# Patient Record
Sex: MALE | Race: WHITE | HISPANIC OR LATINO | URBAN - METROPOLITAN AREA
[De-identification: names, ages, dates, MRNs, and addresses within clinical notes are randomized per-mention and may not be internally consistent; named-entity substitution may affect disease eponyms.]

---

## 2017-08-03 ENCOUNTER — EMERGENCY (EMERGENCY)
Facility: HOSPITAL | Age: 42
LOS: 1 days | End: 2017-08-03
Payer: COMMERCIAL

## 2017-08-03 PROCEDURE — 99284 EMERGENCY DEPT VISIT MOD MDM: CPT

## 2017-08-03 PROCEDURE — 72040 X-RAY EXAM NECK SPINE 2-3 VW: CPT | Mod: 26

## 2017-08-03 PROCEDURE — 72110 X-RAY EXAM L-2 SPINE 4/>VWS: CPT | Mod: 26

## 2017-09-04 ENCOUNTER — EMERGENCY (EMERGENCY)
Facility: HOSPITAL | Age: 42
LOS: 1 days | Discharge: HOME | End: 2017-09-04
Attending: EMERGENCY MEDICINE | Admitting: EMERGENCY MEDICINE
Payer: OTHER MISCELLANEOUS

## 2017-09-04 VITALS
RESPIRATION RATE: 18 BRPM | DIASTOLIC BLOOD PRESSURE: 70 MMHG | OXYGEN SATURATION: 98 % | SYSTOLIC BLOOD PRESSURE: 125 MMHG | HEART RATE: 90 BPM

## 2017-09-04 VITALS
RESPIRATION RATE: 17 BRPM | DIASTOLIC BLOOD PRESSURE: 88 MMHG | OXYGEN SATURATION: 97 % | HEART RATE: 95 BPM | SYSTOLIC BLOOD PRESSURE: 138 MMHG | TEMPERATURE: 98 F

## 2017-09-04 LAB
BASE EXCESS BLDV CALC-SCNC: -0.1 MMOL/L — SIGNIFICANT CHANGE UP
BLOOD GAS VENOUS - CREATININE: 1.56 MG/DL — HIGH (ref 0.5–1.3)
CHLORIDE BLDV-SCNC: 109 MMOL/L — HIGH (ref 96–108)
COHGB MFR BLDV: 1.3 % — SIGNIFICANT CHANGE UP (ref 0.5–1.5)
COHGB MFR BLDV: 15.6 G/DL — SIGNIFICANT CHANGE UP (ref 13–17)
GAS PNL BLDV: 137 MMOL/L — SIGNIFICANT CHANGE UP (ref 136–146)
GLUCOSE BLDV-MCNC: 167 — HIGH (ref 70–99)
HCO3 BLDV-SCNC: 24 MMOL/L — SIGNIFICANT CHANGE UP (ref 20–27)
HCT VFR BLDV CALC: 47.8 % — SIGNIFICANT CHANGE UP (ref 39–51)
HGB BLDV-MCNC: 15.6 G/DL — SIGNIFICANT CHANGE UP (ref 13–17)
LACTATE BLDV-MCNC: 2.6 MMOL/L — HIGH (ref 0.5–2)
METHGB MFR BLDV: 1.4 % — SIGNIFICANT CHANGE UP (ref 0–1.5)
OXYHGB MFR BLDV: 82.7 % — LOW (ref 94–98)
PCO2 BLDV: 45 MMHG — SIGNIFICANT CHANGE UP (ref 41–51)
PH BLDV: 7.36 PH — SIGNIFICANT CHANGE UP (ref 7.32–7.43)
PO2 BLDV: 52 MMHG — HIGH (ref 35–40)
POTASSIUM BLDV-SCNC: 3.5 MMOL/L — SIGNIFICANT CHANGE UP (ref 3.4–4.5)
SAO2 % BLDV: 85 % — SIGNIFICANT CHANGE UP (ref 60–85)

## 2017-09-04 PROCEDURE — 99283 EMERGENCY DEPT VISIT LOW MDM: CPT | Mod: 25

## 2017-09-04 PROCEDURE — 99053 MED SERV 10PM-8AM 24 HR FAC: CPT

## 2017-09-04 RX ORDER — IBUPROFEN 200 MG
600 TABLET ORAL ONCE
Qty: 0 | Refills: 0 | Status: COMPLETED | OUTPATIENT
Start: 2017-09-04 | End: 2017-09-04

## 2017-09-04 RX ADMIN — Medication 600 MILLIGRAM(S): at 04:42
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
## 2017-09-04 NOTE — ED ADULT NURSE NOTE - OBJECTIVE STATEMENT
Received pt in room 7, pt A&Ox4, respirations even and unlabored b/l. Redness noted on neck and shoulders b/l. Skin tears noted on knees b/l. IVL 18g Angiocath noted on left AC by EMS, receiving 1L of NS bolus, pt reports already received x2 1L of NS bags. MD javed at bedside. Awaiting further orders. Will continue to monitor.
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
## 2017-09-04 NOTE — ED PROVIDER NOTE - PROGRESS NOTE DETAILS
Patient feels much improved. Dispo plan reviewed. Antibiotic ointment provided. Patient had received IVF NS 2 liters by EMS
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
## 2017-10-10 ENCOUNTER — TRANSCRIPTION ENCOUNTER (OUTPATIENT)
Age: 42
End: 2017-10-10

## 2018-01-16 ENCOUNTER — TRANSCRIPTION ENCOUNTER (OUTPATIENT)
Age: 43
End: 2018-01-16

## 2018-04-14 ENCOUNTER — EMERGENCY (EMERGENCY)
Facility: HOSPITAL | Age: 43
LOS: 0 days | Discharge: HOME | End: 2018-04-14
Attending: EMERGENCY MEDICINE
Payer: OTHER MISCELLANEOUS

## 2018-04-14 VITALS
RESPIRATION RATE: 16 BRPM | SYSTOLIC BLOOD PRESSURE: 112 MMHG | TEMPERATURE: 98 F | OXYGEN SATURATION: 98 % | DIASTOLIC BLOOD PRESSURE: 61 MMHG | HEART RATE: 59 BPM

## 2018-04-14 VITALS
DIASTOLIC BLOOD PRESSURE: 74 MMHG | TEMPERATURE: 98 F | RESPIRATION RATE: 18 BRPM | HEIGHT: 69 IN | WEIGHT: 184.97 LBS | HEART RATE: 65 BPM | SYSTOLIC BLOOD PRESSURE: 121 MMHG | OXYGEN SATURATION: 98 %

## 2018-04-14 DIAGNOSIS — Z79.1 LONG TERM (CURRENT) USE OF NON-STEROIDAL ANTI-INFLAMMATORIES (NSAID): ICD-10-CM

## 2018-04-14 DIAGNOSIS — R55 SYNCOPE AND COLLAPSE: ICD-10-CM

## 2018-04-14 DIAGNOSIS — Z88.0 ALLERGY STATUS TO PENICILLIN: ICD-10-CM

## 2018-04-14 LAB
ALBUMIN SERPL ELPH-MCNC: 4.1 G/DL — SIGNIFICANT CHANGE UP (ref 3.3–5)
ALP SERPL-CCNC: 77 U/L — SIGNIFICANT CHANGE UP (ref 40–120)
ALT FLD-CCNC: 56 U/L — SIGNIFICANT CHANGE UP (ref 12–78)
ANION GAP SERPL CALC-SCNC: 8 MMOL/L — SIGNIFICANT CHANGE UP (ref 5–17)
APTT BLD: 35.5 SEC — SIGNIFICANT CHANGE UP (ref 27.5–37.4)
AST SERPL-CCNC: 25 U/L — SIGNIFICANT CHANGE UP (ref 15–37)
BILIRUB SERPL-MCNC: 2.2 MG/DL — HIGH (ref 0.2–1.2)
BUN SERPL-MCNC: 24 MG/DL — HIGH (ref 7–23)
CALCIUM SERPL-MCNC: 8.8 MG/DL — SIGNIFICANT CHANGE UP (ref 8.5–10.1)
CHLORIDE SERPL-SCNC: 105 MMOL/L — SIGNIFICANT CHANGE UP (ref 96–108)
CO2 SERPL-SCNC: 30 MMOL/L — SIGNIFICANT CHANGE UP (ref 22–31)
CREAT SERPL-MCNC: 1.27 MG/DL — SIGNIFICANT CHANGE UP (ref 0.5–1.3)
GLUCOSE SERPL-MCNC: 79 MG/DL — SIGNIFICANT CHANGE UP (ref 70–99)
HCT VFR BLD CALC: 43.4 % — SIGNIFICANT CHANGE UP (ref 39–50)
HGB BLD-MCNC: 15.2 G/DL — SIGNIFICANT CHANGE UP (ref 13–17)
INR BLD: 1.05 RATIO — SIGNIFICANT CHANGE UP (ref 0.88–1.16)
MAGNESIUM SERPL-MCNC: 2.4 MG/DL — SIGNIFICANT CHANGE UP (ref 1.6–2.6)
MCHC RBC-ENTMCNC: 31.3 PG — SIGNIFICANT CHANGE UP (ref 27–34)
MCHC RBC-ENTMCNC: 35 GM/DL — SIGNIFICANT CHANGE UP (ref 32–36)
MCV RBC AUTO: 89.5 FL — SIGNIFICANT CHANGE UP (ref 80–100)
NRBC # BLD: 0 /100 WBCS — SIGNIFICANT CHANGE UP (ref 0–0)
PLATELET # BLD AUTO: 248 K/UL — SIGNIFICANT CHANGE UP (ref 150–400)
POTASSIUM SERPL-MCNC: 3.9 MMOL/L — SIGNIFICANT CHANGE UP (ref 3.5–5.3)
POTASSIUM SERPL-SCNC: 3.9 MMOL/L — SIGNIFICANT CHANGE UP (ref 3.5–5.3)
PROT SERPL-MCNC: 7.6 GM/DL — SIGNIFICANT CHANGE UP (ref 6–8.3)
PROTHROM AB SERPL-ACNC: 11.5 SEC — SIGNIFICANT CHANGE UP (ref 9.8–12.7)
RBC # BLD: 4.85 M/UL — SIGNIFICANT CHANGE UP (ref 4.2–5.8)
RBC # FLD: 12.1 % — SIGNIFICANT CHANGE UP (ref 10.3–14.5)
SODIUM SERPL-SCNC: 143 MMOL/L — SIGNIFICANT CHANGE UP (ref 135–145)
TROPONIN I SERPL-MCNC: <.015 NG/ML — SIGNIFICANT CHANGE UP (ref 0.01–0.04)
WBC # BLD: 7.43 K/UL — SIGNIFICANT CHANGE UP (ref 3.8–10.5)
WBC # FLD AUTO: 7.43 K/UL — SIGNIFICANT CHANGE UP (ref 3.8–10.5)

## 2018-04-14 PROCEDURE — 71045 X-RAY EXAM CHEST 1 VIEW: CPT | Mod: 26

## 2018-04-14 PROCEDURE — 70450 CT HEAD/BRAIN W/O DYE: CPT | Mod: 26

## 2018-04-14 PROCEDURE — 99284 EMERGENCY DEPT VISIT MOD MDM: CPT

## 2018-04-14 RX ORDER — SODIUM CHLORIDE 9 MG/ML
1000 INJECTION INTRAMUSCULAR; INTRAVENOUS; SUBCUTANEOUS ONCE
Qty: 0 | Refills: 0 | Status: COMPLETED | OUTPATIENT
Start: 2018-04-14 | End: 2018-04-14

## 2018-04-14 RX ADMIN — SODIUM CHLORIDE 1000 MILLILITER(S): 9 INJECTION INTRAMUSCULAR; INTRAVENOUS; SUBCUTANEOUS at 16:41

## 2018-04-14 RX ADMIN — SODIUM CHLORIDE 1000 MILLILITER(S): 9 INJECTION INTRAMUSCULAR; INTRAVENOUS; SUBCUTANEOUS at 15:40

## 2018-04-14 NOTE — ED PROVIDER NOTE - PROGRESS NOTE DETAILS
Results reported to patient--grossly benign, likely vasovagal pre-syncope  Pt. reports feeling better after meds  pt. agrees to f/u with primary care outpt. as well as cardio  pt. understands to return to ED if symptoms worsen; will d/c
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
## 2018-04-14 NOTE — ED PROVIDER NOTE - PHYSICAL EXAMINATION
Vitals: WNL  Gen: AAOx3, NAD, sitting comfortably in stretcher, calm, cooperative, pleasant, non-toxic  Head: ncat, perrla, eomi b/l  Neck: supple, no lymphadenopathy, no midline deviation  Heart: rrr, no m/r/g  Lungs: CTA b/l, no rales/ronchi/wheezes  Abd: soft, nontender, non-distended, no rebound or guarding  Ext: no clubbing/cyanosis/edema  Neuro: sensation and muscle strength intact b/l, steady gait, CN2-12 intact b/l

## 2018-04-15 PROCEDURE — 93010 ELECTROCARDIOGRAM REPORT: CPT

## 2018-09-23 ENCOUNTER — EMERGENCY (EMERGENCY)
Facility: HOSPITAL | Age: 43
LOS: 1 days | Discharge: HOME | End: 2018-09-23
Attending: EMERGENCY MEDICINE
Payer: SELF-PAY

## 2018-09-23 VITALS
HEART RATE: 61 BPM | SYSTOLIC BLOOD PRESSURE: 130 MMHG | DIASTOLIC BLOOD PRESSURE: 83 MMHG | OXYGEN SATURATION: 100 % | RESPIRATION RATE: 18 BRPM | TEMPERATURE: 98 F

## 2018-09-23 VITALS — WEIGHT: 179.9 LBS | HEIGHT: 69 IN

## 2018-09-23 DIAGNOSIS — Z98.890 OTHER SPECIFIED POSTPROCEDURAL STATES: Chronic | ICD-10-CM

## 2018-09-23 DIAGNOSIS — Z87.81 PERSONAL HISTORY OF (HEALED) TRAUMATIC FRACTURE: Chronic | ICD-10-CM

## 2018-09-23 LAB
APPEARANCE UR: CLEAR — SIGNIFICANT CHANGE UP
BILIRUB UR-MCNC: NEGATIVE — SIGNIFICANT CHANGE UP
COLOR SPEC: YELLOW — SIGNIFICANT CHANGE UP
DIFF PNL FLD: ABNORMAL
EPI CELLS # UR: SIGNIFICANT CHANGE UP
GLUCOSE UR QL: NEGATIVE MG/DL — SIGNIFICANT CHANGE UP
KETONES UR-MCNC: NEGATIVE — SIGNIFICANT CHANGE UP
LEUKOCYTE ESTERASE UR-ACNC: NEGATIVE — SIGNIFICANT CHANGE UP
NITRITE UR-MCNC: NEGATIVE — SIGNIFICANT CHANGE UP
PH UR: 5 — SIGNIFICANT CHANGE UP (ref 5–8)
PROT UR-MCNC: NEGATIVE MG/DL — SIGNIFICANT CHANGE UP
RBC CASTS # UR COMP ASSIST: ABNORMAL /HPF (ref 0–4)
SP GR SPEC: 1.02 — SIGNIFICANT CHANGE UP (ref 1.01–1.02)
UROBILINOGEN FLD QL: NEGATIVE MG/DL — SIGNIFICANT CHANGE UP
WBC UR QL: SIGNIFICANT CHANGE UP

## 2018-09-23 PROCEDURE — 99284 EMERGENCY DEPT VISIT MOD MDM: CPT | Mod: 25

## 2018-09-23 PROCEDURE — 99284 EMERGENCY DEPT VISIT MOD MDM: CPT

## 2018-09-23 PROCEDURE — 81001 URINALYSIS AUTO W/SCOPE: CPT

## 2018-09-23 PROCEDURE — 74176 CT ABD & PELVIS W/O CONTRAST: CPT

## 2018-09-23 PROCEDURE — 74176 CT ABD & PELVIS W/O CONTRAST: CPT | Mod: 26

## 2018-09-23 RX ORDER — IBUPROFEN 200 MG
600 TABLET ORAL ONCE
Qty: 0 | Refills: 0 | Status: COMPLETED | OUTPATIENT
Start: 2018-09-23 | End: 2018-09-23

## 2018-09-23 RX ADMIN — Medication 600 MILLIGRAM(S): at 11:33

## 2018-09-23 NOTE — ED STATDOCS - PHYSICAL EXAMINATION
VITAL SIGNS: I have reviewed nursing notes and confirm.  CONSTITUTIONAL: Well-developed; well-nourished; in no acute distress.  SKIN: Skin exam is warm and dry, no acute rash.  HEAD: Normocephalic; atraumatic.  EYES: PERRL, EOM intact; conjunctiva and sclera clear.  ENT: No nasal discharge; airway clear. Throat clear.  NECK: Supple; non tender.  No lymphadenopathy.  CARD: S1, S2 normal; no murmurs, gallops, or rubs. Regular rate and rhythm.  RESP: No wheezes, rales or rhonchi.  ABD: Normal bowel sounds; soft; non-distended; no hepatosplenomegaly.  LLQ tenderness, no CVA tenderness, no palpable hernia   EXT: Normal ROM. No clubbing, cyanosis or edema.  NEURO: Alert, oriented. Grossly unremarkable. No focal deficits. no facial droop. moves all extremities.  PSYCH: Cooperative, appropriate.

## 2018-09-23 NOTE — ED STATDOCS - ATTENDING CONTRIBUTION TO CARE
I, Darian Burkett, performed the initial face to face bedside interview with this patient regarding history of present illness, review of symptoms and relevant past medical, social and family history.  I completed an independent physical examination.  I was the initial provider who evaluated this patient. I have signed out the follow up of any pending tests (i.e. labs, radiological studies) to the ACP.  I have communicated the patient’s plan of care and disposition with the ACP.

## 2018-09-23 NOTE — ED STATDOCS - OBJECTIVE STATEMENT
41 y/o M pt presents to ED c/o gradual worsening left lower abdominal pain for the past 2 days. Pain described as a sharp, stabbing sensation. Worsening with movement, getting out of the vehicle. He admits to doing strenuous training for work a few days ago when he pulled his groin area; however symptoms started days later. No hx of kidney stones. Denies nausea, vomiting, diarrhea, fevers, chills, cough. No further complaints at this time.

## 2018-09-23 NOTE — ED STATDOCS - PROGRESS NOTE DETAILS
Pt moved form intake Room. Pt seen and evaluated by intake Physician. HPI, Physical examination performed by intake Physician . Note reviewed and followup examination performed by me consistent with initial assessment. Agrees with intake Physician plan and tests. Pt CT + pelvic mesentery adenitis. Pt informed about his results and will continue with Naproxen for his pain. Pt D/C in stable condition with F/U with PCP.

## 2018-09-23 NOTE — ED ADULT NURSE NOTE - OBJECTIVE STATEMENT
Patient arrived to ED today with c/o left lower abdominal pain for the past 2 days.  Patient denies chest pain, SOB, numbness or tingling, fever, n/d/v.

## 2018-09-23 NOTE — ED ADULT TRIAGE NOTE - CHIEF COMPLAINT QUOTE
"I am having a real sharp pain on  my left side that started two days and its worsening. " Pt denies n/v/d states he is having a regular bowel movements just has a sharp pain.,  pt denies radiation of pain.

## 2018-09-23 NOTE — ED STATDOCS - NS ED ROS FT
Review of Systems:  	•	CONSTITUTIONAL - no fever, no diaphoresis, no weight change  	•	SKIN - no rash  	•	HEMATOLOGIC - no bleeding, no bruising  	•	EYES - no eye pain, no blurred vision  	•	ENT - no change in hearing, no pain  	•	RESPIRATORY - no shortness of breath, no cough  	•	CARDIAC - no chest pain, no palpitations  	•	GI - + abd pain, no nausea, no vomiting, no diarrhea, no constipation, no bleeding  	•	GENITO-URINARY - no discharge, no dysuria; no hematuria,   	•	ENDO - no polydypsia, no polyurea, no heat/no cold intolerance  	•	MUSCULOSKELETAL - no joint paint, no swelling, no redness  	•	NEUROLOGIC - no weakness, no headache, no anesthesia, no paresthesias  	•	PSYCH - no anxiety, non suicidal, non homicidal, no hallucination, no depression

## 2018-09-23 NOTE — ED STATDOCS - MEDICAL DECISION MAKING DETAILS
pt with intermittent sharp LLQ pain for 2 days non radiation, able to tolerate PO UA, CT abdomen to rule out kidney stones , Motrin for pain.

## 2018-09-23 NOTE — ED STATDOCS - CARE PLAN
Principal Discharge DX:	Groin strain, left, initial encounter  Assessment and plan of treatment:	Continue with Naproxen for pain and F/U with PCP as discussed  Secondary Diagnosis:	Epiploic appendagitis

## 2019-10-15 ENCOUNTER — TRANSCRIPTION ENCOUNTER (OUTPATIENT)
Age: 44
End: 2019-10-15

## 2020-08-28 ENCOUNTER — APPOINTMENT (OUTPATIENT)
Dept: OTOLARYNGOLOGY | Facility: CLINIC | Age: 45
End: 2020-08-28

## 2021-12-31 ENCOUNTER — TRANSCRIPTION ENCOUNTER (OUTPATIENT)
Age: 46
End: 2021-12-31

## 2024-05-19 ENCOUNTER — INPATIENT (INPATIENT)
Facility: HOSPITAL | Age: 49
LOS: 7 days | Discharge: HOME | DRG: 684 | End: 2024-05-27
Attending: STUDENT IN AN ORGANIZED HEALTH CARE EDUCATION/TRAINING PROGRAM
Payer: COMMERCIAL

## 2024-05-19 VITALS
HEIGHT: 69 IN | RESPIRATION RATE: 20 BRPM | WEIGHT: 176.37 LBS | HEART RATE: 76 BPM | OXYGEN SATURATION: 97 % | TEMPERATURE: 98 F | SYSTOLIC BLOOD PRESSURE: 106 MMHG | DIASTOLIC BLOOD PRESSURE: 75 MMHG

## 2024-05-19 DIAGNOSIS — Z98.890 OTHER SPECIFIED POSTPROCEDURAL STATES: Chronic | ICD-10-CM

## 2024-05-19 DIAGNOSIS — Z87.81 PERSONAL HISTORY OF (HEALED) TRAUMATIC FRACTURE: Chronic | ICD-10-CM

## 2024-05-19 DIAGNOSIS — N17.9 ACUTE KIDNEY FAILURE, UNSPECIFIED: ICD-10-CM

## 2024-05-19 LAB
ALBUMIN SERPL ELPH-MCNC: 4.4 G/DL — SIGNIFICANT CHANGE UP (ref 3.3–5.2)
ALP SERPL-CCNC: 84 U/L — SIGNIFICANT CHANGE UP (ref 40–120)
ALT FLD-CCNC: 71 U/L — HIGH
ANION GAP SERPL CALC-SCNC: 23 MMOL/L — HIGH (ref 5–17)
AST SERPL-CCNC: 55 U/L — HIGH
BASE EXCESS BLDV CALC-SCNC: -3.9 MMOL/L — LOW (ref -2–3)
BASOPHILS # BLD AUTO: 0.05 K/UL — SIGNIFICANT CHANGE UP (ref 0–0.2)
BASOPHILS NFR BLD AUTO: 0.6 % — SIGNIFICANT CHANGE UP (ref 0–2)
BILIRUB SERPL-MCNC: 0.8 MG/DL — SIGNIFICANT CHANGE UP (ref 0.4–2)
BUN SERPL-MCNC: 55.7 MG/DL — HIGH (ref 8–20)
CA-I SERPL-SCNC: 1.02 MMOL/L — LOW (ref 1.15–1.33)
CALCIUM SERPL-MCNC: 9.1 MG/DL — SIGNIFICANT CHANGE UP (ref 8.4–10.5)
CHLORIDE BLDV-SCNC: 100 MMOL/L — SIGNIFICANT CHANGE UP (ref 96–108)
CHLORIDE SERPL-SCNC: 89 MMOL/L — LOW (ref 96–108)
CO2 SERPL-SCNC: 20 MMOL/L — LOW (ref 22–29)
CREAT SERPL-MCNC: 3.97 MG/DL — HIGH (ref 0.5–1.3)
EGFR: 18 ML/MIN/1.73M2 — LOW
EOSINOPHIL # BLD AUTO: 0.01 K/UL — SIGNIFICANT CHANGE UP (ref 0–0.5)
EOSINOPHIL NFR BLD AUTO: 0.1 % — SIGNIFICANT CHANGE UP (ref 0–6)
FLUAV AG NPH QL: DETECTED
FLUBV AG NPH QL: SIGNIFICANT CHANGE UP
GAS PNL BLDV: 130 MMOL/L — LOW (ref 136–145)
GAS PNL BLDV: SIGNIFICANT CHANGE UP
GLUCOSE BLDV-MCNC: 122 MG/DL — HIGH (ref 70–99)
GLUCOSE SERPL-MCNC: 126 MG/DL — HIGH (ref 70–99)
HCO3 BLDV-SCNC: 23 MMOL/L — SIGNIFICANT CHANGE UP (ref 22–29)
HCT VFR BLD CALC: 53.8 % — HIGH (ref 39–50)
HCT VFR BLDA CALC: 50 % — SIGNIFICANT CHANGE UP
HGB BLD CALC-MCNC: 16.5 G/DL — SIGNIFICANT CHANGE UP (ref 12.6–17.4)
HGB BLD-MCNC: 18.4 G/DL — HIGH (ref 13–17)
IMM GRANULOCYTES NFR BLD AUTO: 0.7 % — SIGNIFICANT CHANGE UP (ref 0–0.9)
LACTATE BLDV-MCNC: 1.2 MMOL/L — SIGNIFICANT CHANGE UP (ref 0.5–2)
LYMPHOCYTES # BLD AUTO: 1.07 K/UL — SIGNIFICANT CHANGE UP (ref 1–3.3)
LYMPHOCYTES # BLD AUTO: 12.1 % — LOW (ref 13–44)
MCHC RBC-ENTMCNC: 28.2 PG — SIGNIFICANT CHANGE UP (ref 27–34)
MCHC RBC-ENTMCNC: 34.2 GM/DL — SIGNIFICANT CHANGE UP (ref 32–36)
MCV RBC AUTO: 82.4 FL — SIGNIFICANT CHANGE UP (ref 80–100)
MONOCYTES # BLD AUTO: 1.23 K/UL — HIGH (ref 0–0.9)
MONOCYTES NFR BLD AUTO: 14 % — SIGNIFICANT CHANGE UP (ref 2–14)
NEUTROPHILS # BLD AUTO: 6.39 K/UL — SIGNIFICANT CHANGE UP (ref 1.8–7.4)
NEUTROPHILS NFR BLD AUTO: 72.5 % — SIGNIFICANT CHANGE UP (ref 43–77)
PCO2 BLDV: 50 MMHG — SIGNIFICANT CHANGE UP (ref 42–55)
PH BLDV: 7.27 — LOW (ref 7.32–7.43)
PLATELET # BLD AUTO: 227 K/UL — SIGNIFICANT CHANGE UP (ref 150–400)
PO2 BLDV: 43 MMHG — SIGNIFICANT CHANGE UP (ref 25–45)
POTASSIUM BLDV-SCNC: 4.1 MMOL/L — SIGNIFICANT CHANGE UP (ref 3.5–5.1)
POTASSIUM SERPL-MCNC: 4.4 MMOL/L — SIGNIFICANT CHANGE UP (ref 3.5–5.3)
POTASSIUM SERPL-SCNC: 4.4 MMOL/L — SIGNIFICANT CHANGE UP (ref 3.5–5.3)
PROT SERPL-MCNC: 8.7 G/DL — SIGNIFICANT CHANGE UP (ref 6.6–8.7)
RBC # BLD: 6.53 M/UL — HIGH (ref 4.2–5.8)
RBC # FLD: 13.7 % — SIGNIFICANT CHANGE UP (ref 10.3–14.5)
RSV RNA NPH QL NAA+NON-PROBE: SIGNIFICANT CHANGE UP
SAO2 % BLDV: 72 % — SIGNIFICANT CHANGE UP
SARS-COV-2 RNA SPEC QL NAA+PROBE: SIGNIFICANT CHANGE UP
SODIUM SERPL-SCNC: 132 MMOL/L — LOW (ref 135–145)
WBC # BLD: 8.81 K/UL — SIGNIFICANT CHANGE UP (ref 3.8–10.5)
WBC # FLD AUTO: 8.81 K/UL — SIGNIFICANT CHANGE UP (ref 3.8–10.5)

## 2024-05-19 PROCEDURE — 99285 EMERGENCY DEPT VISIT HI MDM: CPT

## 2024-05-19 PROCEDURE — 93010 ELECTROCARDIOGRAM REPORT: CPT

## 2024-05-19 PROCEDURE — 99223 1ST HOSP IP/OBS HIGH 75: CPT

## 2024-05-19 RX ORDER — SODIUM CHLORIDE 9 MG/ML
1000 INJECTION INTRAMUSCULAR; INTRAVENOUS; SUBCUTANEOUS
Refills: 0 | Status: DISCONTINUED | OUTPATIENT
Start: 2024-05-19 | End: 2024-05-19

## 2024-05-19 RX ORDER — ONDANSETRON 8 MG/1
4 TABLET, FILM COATED ORAL ONCE
Refills: 0 | Status: COMPLETED | OUTPATIENT
Start: 2024-05-19 | End: 2024-05-19

## 2024-05-19 RX ORDER — SODIUM CHLORIDE 9 MG/ML
1000 INJECTION INTRAMUSCULAR; INTRAVENOUS; SUBCUTANEOUS ONCE
Refills: 0 | Status: COMPLETED | OUTPATIENT
Start: 2024-05-19 | End: 2024-05-19

## 2024-05-19 RX ORDER — ONDANSETRON 8 MG/1
4 TABLET, FILM COATED ORAL EVERY 8 HOURS
Refills: 0 | Status: DISCONTINUED | OUTPATIENT
Start: 2024-05-19 | End: 2024-05-27

## 2024-05-19 RX ORDER — SODIUM CHLORIDE 9 MG/ML
2000 INJECTION INTRAMUSCULAR; INTRAVENOUS; SUBCUTANEOUS ONCE
Refills: 0 | Status: COMPLETED | OUTPATIENT
Start: 2024-05-19 | End: 2024-05-19

## 2024-05-19 RX ORDER — IBUPROFEN 200 MG
0 TABLET ORAL
Qty: 0 | Refills: 0 | DISCHARGE

## 2024-05-19 RX ORDER — KETOROLAC TROMETHAMINE 30 MG/ML
15 SYRINGE (ML) INJECTION ONCE
Refills: 0 | Status: DISCONTINUED | OUTPATIENT
Start: 2024-05-19 | End: 2024-05-19

## 2024-05-19 RX ORDER — FLUTICASONE PROPIONATE 50 MCG
1 SPRAY, SUSPENSION NASAL
Refills: 0 | DISCHARGE

## 2024-05-19 RX ORDER — LANOLIN ALCOHOL/MO/W.PET/CERES
3 CREAM (GRAM) TOPICAL AT BEDTIME
Refills: 0 | Status: DISCONTINUED | OUTPATIENT
Start: 2024-05-19 | End: 2024-05-27

## 2024-05-19 RX ORDER — ACETAMINOPHEN 500 MG
650 TABLET ORAL EVERY 6 HOURS
Refills: 0 | Status: DISCONTINUED | OUTPATIENT
Start: 2024-05-19 | End: 2024-05-27

## 2024-05-19 RX ORDER — SODIUM BICARBONATE 1 MEQ/ML
0.19 SYRINGE (ML) INTRAVENOUS
Qty: 150 | Refills: 0 | Status: DISCONTINUED | OUTPATIENT
Start: 2024-05-19 | End: 2024-05-20

## 2024-05-19 RX ADMIN — Medication 15 MILLIGRAM(S): at 15:23

## 2024-05-19 RX ADMIN — SODIUM CHLORIDE 2000 MILLILITER(S): 9 INJECTION INTRAMUSCULAR; INTRAVENOUS; SUBCUTANEOUS at 15:23

## 2024-05-19 RX ADMIN — Medication 100 MEQ/KG/HR: at 17:41

## 2024-05-19 RX ADMIN — Medication 650 MILLIGRAM(S): at 21:31

## 2024-05-19 RX ADMIN — ONDANSETRON 4 MILLIGRAM(S): 8 TABLET, FILM COATED ORAL at 15:23

## 2024-05-19 NOTE — ED ADULT NURSE NOTE - OBJECTIVE STATEMENT
Pt received A&Ox4 c/o nvd, generalized body weakness, body aches for few days. Decreased PO intake. States lost 10 pounds in 1 week. Recent travels to [place] Denies any fevers, chill, sob, chest pain. Respirations even & unlabored. NAD. Pt made aware of plan of care and verbalized understanding.

## 2024-05-19 NOTE — ED PROVIDER NOTE - CLINICAL SUMMARY MEDICAL DECISION MAKING FREE TEXT BOX
47yo male with no PMH presenting with n/v/d a/w fever, found to be flu A postiive. Check labs, give IV fluids, toradol,, zofran and reassess. Ashly Sandy DO 47yo male with no PMH presenting with n/v/d a/w fever, found to be flu A postiive. Check labs, give IV fluids, toradol,, zofran and reassess. Ashly Sandy DO    Labs reviewed patient noted to have JOSIE- Cr 3.97, anion gap 23. VBG ordered. Patient denies urinary retention, able to void. D/w hospitalist will admit for further management, additional liter of NS ordered, started on maintenance fluids.

## 2024-05-19 NOTE — H&P ADULT - NSICDXPASTMEDICALHX_GEN_ALL_CORE_FT
PAST MEDICAL HISTORY:  No pertinent past medical history     No pertinent past medical history     WILFREDO on CPAP

## 2024-05-19 NOTE — ED ADULT NURSE NOTE - NS_SISCREENINGSR_GEN_ALL_ED
CONSTITUTIONAL: Well-developed; well-nourished; in no acute distress.   SKIN: warm, dry, erythema to left upper chest, 4 cm area of erythema to left flexor surface of forearm, small 1cm abrasion to left knee  HEAD: Normocephalic; atraumatic.  EYES: PERRL, EOMI, no conjunctival erythema  ENT: No nasal discharge; airway clear.  NECK: Supple; non tender.  CARD: S1, S2 normal; no murmurs, gallops, or rubs. Regular rate and rhythm.   RESP: No wheezes, rales or rhonchi.  ABD: soft ntnd  EXT: Normal ROM.  No clubbing, cyanosis or edema.   LYMPH: No acute cervical adenopathy.  NEURO: Alert, oriented, grossly unremarkable, benign neuro exam  PSYCH: Cooperative, appropriate. CONSTITUTIONAL: Well-developed; well-nourished; in no acute distress.   SKIN: warm, dry, erythema to left upper chest, 4 cm area of erythema to left flexor surface of forearm, small 1cm abrasion to left knee  HEAD: Normocephalic; atraumatic.  EYES: PERRL, EOMI, no conjunctival erythema  ENT: No nasal discharge; airway clear.  NECK: Supple; non tender.  CARD: S1, S2 normal; no murmurs, gallops, or rubs. Regular rate and rhythm.   RESP: No wheezes, rales or rhonchi.  ABD: soft ntnd  EXT: TTP L pinky, pulses and sensation intact. Full ROM  LYMPH: No acute cervical adenopathy.  NEURO: Alert, oriented, grossly unremarkable, benign neuro exam  PSYCH: Cooperative, appropriate. Negative
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
## 2024-05-19 NOTE — ED PROVIDER NOTE - OBJECTIVE STATEMENT
49yo male with no PMH presenting with n/v/d a/w fever x 2 days. patient recently traveled to [facility], went to  Friday and was told he has influenza. patient states he has been unable to tolerate PO over last week and has lost 10 lbs.

## 2024-05-19 NOTE — H&P ADULT - HISTORY OF PRESENT ILLNESS
48 yr old male with WILFREDO on CPAP presents with complaints of weakness and diarrhea for 4 days. States he was visiting  for 7 days, returned on Thursday. He reports eating a local delicacy prior to his flight. He reports he felt sick and sore on his flight back, started to have vomiting and multiple episodes of watery stools with some runny nose. He went to urgent care on Friday and was told he has Influenza, was prescribed Tamiflu and Clindamycin, which he did not take. He continued to have poor oral intake, with ongoing vomiting and diarrhea since. He felt cramps in his LE today and felt dehydrated hence came in for evaluation. Felt chills, fever, took one Ibuprofen 800 mg yesterday. No headaches, abdominal pain.

## 2024-05-19 NOTE — ED PROVIDER NOTE - ATTENDING APP SHARED VISIT CONTRIBUTION OF CARE
I, Ashly Sandy, performed a face to face bedside interview with this patient regarding history of present illness, review of symptoms and relevant past medical, social and family history.  I completed an independent physical examination. Medical decision making, follow-up on ordered tests (ie labs, radiologic studies) and re-evaluation of the patient's status has been communicated to the ACP.  Disposition of the patient will be based on test outcome and response to ED interventions.

## 2024-05-20 LAB
ALBUMIN SERPL ELPH-MCNC: 3.5 G/DL — SIGNIFICANT CHANGE UP (ref 3.3–5.2)
ALBUMIN SERPL ELPH-MCNC: 3.7 G/DL — SIGNIFICANT CHANGE UP (ref 3.3–5.2)
ALP SERPL-CCNC: 64 U/L — SIGNIFICANT CHANGE UP (ref 40–120)
ALP SERPL-CCNC: 65 U/L — SIGNIFICANT CHANGE UP (ref 40–120)
ALT FLD-CCNC: 55 U/L — HIGH
ALT FLD-CCNC: 56 U/L — HIGH
ANION GAP SERPL CALC-SCNC: 11 MMOL/L — SIGNIFICANT CHANGE UP (ref 5–17)
ANION GAP SERPL CALC-SCNC: 14 MMOL/L — SIGNIFICANT CHANGE UP (ref 5–17)
APPEARANCE UR: CLEAR — SIGNIFICANT CHANGE UP
AST SERPL-CCNC: 38 U/L — SIGNIFICANT CHANGE UP
AST SERPL-CCNC: 41 U/L — HIGH
BACTERIA # UR AUTO: NEGATIVE /HPF — SIGNIFICANT CHANGE UP
BASOPHILS # BLD AUTO: 0.03 K/UL — SIGNIFICANT CHANGE UP (ref 0–0.2)
BASOPHILS NFR BLD AUTO: 0.4 % — SIGNIFICANT CHANGE UP (ref 0–2)
BILIRUB SERPL-MCNC: 0.7 MG/DL — SIGNIFICANT CHANGE UP (ref 0.4–2)
BILIRUB SERPL-MCNC: 0.7 MG/DL — SIGNIFICANT CHANGE UP (ref 0.4–2)
BILIRUB UR-MCNC: NEGATIVE — SIGNIFICANT CHANGE UP
BUN SERPL-MCNC: 29.4 MG/DL — HIGH (ref 8–20)
BUN SERPL-MCNC: 39 MG/DL — HIGH (ref 8–20)
C DIFF BY PCR RESULT: SIGNIFICANT CHANGE UP
CALCIUM SERPL-MCNC: 8.2 MG/DL — LOW (ref 8.4–10.5)
CALCIUM SERPL-MCNC: 8.3 MG/DL — LOW (ref 8.4–10.5)
CAST: 1 /LPF — SIGNIFICANT CHANGE UP (ref 0–4)
CHLORIDE SERPL-SCNC: 95 MMOL/L — LOW (ref 96–108)
CHLORIDE SERPL-SCNC: 97 MMOL/L — SIGNIFICANT CHANGE UP (ref 96–108)
CO2 SERPL-SCNC: 25 MMOL/L — SIGNIFICANT CHANGE UP (ref 22–29)
CO2 SERPL-SCNC: 29 MMOL/L — SIGNIFICANT CHANGE UP (ref 22–29)
COLOR SPEC: YELLOW — SIGNIFICANT CHANGE UP
CREAT SERPL-MCNC: 1.39 MG/DL — HIGH (ref 0.5–1.3)
CREAT SERPL-MCNC: 1.84 MG/DL — HIGH (ref 0.5–1.3)
DIFF PNL FLD: ABNORMAL
EC EAEA GENE STL QL NAA+PROBE: DETECTED
EGFR: 45 ML/MIN/1.73M2 — LOW
EGFR: 63 ML/MIN/1.73M2 — SIGNIFICANT CHANGE UP
EOSINOPHIL # BLD AUTO: 0 K/UL — SIGNIFICANT CHANGE UP (ref 0–0.5)
EOSINOPHIL NFR BLD AUTO: 0 % — SIGNIFICANT CHANGE UP (ref 0–6)
EPEC DNA STL QL NAA+PROBE: DETECTED
GI PCR PANEL: DETECTED
GIANT PLATELETS BLD QL SMEAR: PRESENT — SIGNIFICANT CHANGE UP
GLUCOSE SERPL-MCNC: 122 MG/DL — HIGH (ref 70–99)
GLUCOSE SERPL-MCNC: 157 MG/DL — HIGH (ref 70–99)
GLUCOSE UR QL: NEGATIVE MG/DL — SIGNIFICANT CHANGE UP
HCT VFR BLD CALC: 46.1 % — SIGNIFICANT CHANGE UP (ref 39–50)
HGB BLD-MCNC: 15.6 G/DL — SIGNIFICANT CHANGE UP (ref 13–17)
INR BLD: 0.92 RATIO — SIGNIFICANT CHANGE UP (ref 0.85–1.18)
KETONES UR-MCNC: ABNORMAL MG/DL
LEUKOCYTE ESTERASE UR-ACNC: NEGATIVE — SIGNIFICANT CHANGE UP
LYMPHOCYTES # BLD AUTO: 1.3 K/UL — SIGNIFICANT CHANGE UP (ref 1–3.3)
LYMPHOCYTES # BLD AUTO: 17.3 % — SIGNIFICANT CHANGE UP (ref 13–44)
MANUAL SMEAR VERIFICATION: SIGNIFICANT CHANGE UP
MCHC RBC-ENTMCNC: 27.9 PG — SIGNIFICANT CHANGE UP (ref 27–34)
MCHC RBC-ENTMCNC: 33.8 GM/DL — SIGNIFICANT CHANGE UP (ref 32–36)
MCV RBC AUTO: 82.3 FL — SIGNIFICANT CHANGE UP (ref 80–100)
METAMYELOCYTES # FLD: 0.4 % — HIGH (ref 0–0)
MONOCYTES # BLD AUTO: 0.67 K/UL — SIGNIFICANT CHANGE UP (ref 0–0.9)
MONOCYTES NFR BLD AUTO: 8.9 % — SIGNIFICANT CHANGE UP (ref 2–14)
NEUTROPHILS # BLD AUTO: 5.07 K/UL — SIGNIFICANT CHANGE UP (ref 1.8–7.4)
NEUTROPHILS NFR BLD AUTO: 44.7 % — SIGNIFICANT CHANGE UP (ref 43–77)
NEUTS BAND # BLD: 23 % — HIGH (ref 0–8)
NITRITE UR-MCNC: NEGATIVE — SIGNIFICANT CHANGE UP
OVALOCYTES BLD QL SMEAR: SLIGHT — SIGNIFICANT CHANGE UP
PH UR: 6 — SIGNIFICANT CHANGE UP (ref 5–8)
PLAT MORPH BLD: ABNORMAL
PLATELET # BLD AUTO: 195 K/UL — SIGNIFICANT CHANGE UP (ref 150–400)
POIKILOCYTOSIS BLD QL AUTO: SLIGHT — SIGNIFICANT CHANGE UP
POLYCHROMASIA BLD QL SMEAR: SLIGHT — SIGNIFICANT CHANGE UP
POTASSIUM SERPL-MCNC: 3.3 MMOL/L — LOW (ref 3.5–5.3)
POTASSIUM SERPL-MCNC: 3.7 MMOL/L — SIGNIFICANT CHANGE UP (ref 3.5–5.3)
POTASSIUM SERPL-SCNC: 3.3 MMOL/L — LOW (ref 3.5–5.3)
POTASSIUM SERPL-SCNC: 3.7 MMOL/L — SIGNIFICANT CHANGE UP (ref 3.5–5.3)
PROT SERPL-MCNC: 6.7 G/DL — SIGNIFICANT CHANGE UP (ref 6.6–8.7)
PROT SERPL-MCNC: 6.8 G/DL — SIGNIFICANT CHANGE UP (ref 6.6–8.7)
PROT UR-MCNC: 30 MG/DL
PROTHROM AB SERPL-ACNC: 10.2 SEC — SIGNIFICANT CHANGE UP (ref 9.5–13)
RBC # BLD: 5.6 M/UL — SIGNIFICANT CHANGE UP (ref 4.2–5.8)
RBC # FLD: 13.2 % — SIGNIFICANT CHANGE UP (ref 10.3–14.5)
RBC BLD AUTO: ABNORMAL
RBC CASTS # UR COMP ASSIST: 3 /HPF — SIGNIFICANT CHANGE UP (ref 0–4)
SALMONELLA DNA STL QL NAA+PROBE: DETECTED
SMUDGE CELLS # BLD: PRESENT — SIGNIFICANT CHANGE UP
SODIUM SERPL-SCNC: 135 MMOL/L — SIGNIFICANT CHANGE UP (ref 135–145)
SODIUM SERPL-SCNC: 136 MMOL/L — SIGNIFICANT CHANGE UP (ref 135–145)
SP GR SPEC: 1.03 — SIGNIFICANT CHANGE UP (ref 1–1.03)
SQUAMOUS # UR AUTO: 2 /HPF — SIGNIFICANT CHANGE UP (ref 0–5)
UROBILINOGEN FLD QL: 1 MG/DL — SIGNIFICANT CHANGE UP (ref 0.2–1)
VARIANT LYMPHS # BLD: 5.3 % — SIGNIFICANT CHANGE UP (ref 0–6)
WBC # BLD: 7.49 K/UL — SIGNIFICANT CHANGE UP (ref 3.8–10.5)
WBC # FLD AUTO: 7.49 K/UL — SIGNIFICANT CHANGE UP (ref 3.8–10.5)
WBC UR QL: 1 /HPF — SIGNIFICANT CHANGE UP (ref 0–5)

## 2024-05-20 PROCEDURE — 76775 US EXAM ABDO BACK WALL LIM: CPT | Mod: 26

## 2024-05-20 PROCEDURE — 99233 SBSQ HOSP IP/OBS HIGH 50: CPT | Mod: GC

## 2024-05-20 RX ORDER — POTASSIUM CHLORIDE 20 MEQ
20 PACKET (EA) ORAL
Refills: 0 | Status: COMPLETED | OUTPATIENT
Start: 2024-05-20 | End: 2024-05-20

## 2024-05-20 RX ORDER — SODIUM CHLORIDE 9 MG/ML
1000 INJECTION, SOLUTION INTRAVENOUS
Refills: 0 | Status: DISCONTINUED | OUTPATIENT
Start: 2024-05-20 | End: 2024-05-25

## 2024-05-20 RX ORDER — POTASSIUM CHLORIDE 20 MEQ
40 PACKET (EA) ORAL ONCE
Refills: 0 | Status: DISCONTINUED | OUTPATIENT
Start: 2024-05-20 | End: 2024-05-20

## 2024-05-20 RX ADMIN — SODIUM CHLORIDE 100 MILLILITER(S): 9 INJECTION, SOLUTION INTRAVENOUS at 23:02

## 2024-05-20 RX ADMIN — Medication 20 MILLIEQUIVALENT(S): at 18:13

## 2024-05-20 RX ADMIN — Medication 20 MILLIEQUIVALENT(S): at 21:11
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
## 2024-05-20 NOTE — PROGRESS NOTE ADULT - SUBJECTIVE AND OBJECTIVE BOX
Patient is a 48y old  Male who presents with a chief complaint of weakness (19 May 2024 17:07)      INTERVAL HPI/OVERNIGHT EVENTS:    No acute overnight events.  Patient denies chest pain, dyspnea, abdominal pain, fever/chills    MEDICATIONS  (STANDING):  sodium bicarbonate  Infusion 0.188 mEq/kG/Hr (100 mL/Hr) IV Continuous <Continuous>    MEDICATIONS  (PRN):  acetaminophen     Tablet .. 650 milliGRAM(s) Oral every 6 hours PRN Temp greater or equal to 38C (100.4F), Mild Pain (1 - 3)  aluminum hydroxide/magnesium hydroxide/simethicone Suspension 30 milliLiter(s) Oral every 4 hours PRN Dyspepsia  melatonin 3 milliGRAM(s) Oral at bedtime PRN Insomnia  ondansetron Injectable 4 milliGRAM(s) IV Push every 8 hours PRN Nausea and/or Vomiting      Allergies    penicillin (Hives)    Intolerances        REVIEW OF SYSTEMS:  CONSTITUTIONAL: No fever, weight loss, or fatigue  RESPIRATORY: No cough, wheezing, chills or hemoptysis; No shortness of breath  CARDIOVASCULAR: No chest pain, palpitations, dizziness, or leg swelling  GASTROINTESTINAL: No abdominal or epigastric pain. No nausea, vomiting, or hematemesis; No diarrhea or constipation. No melena or hematochezia.  NEUROLOGICAL: No headaches, memory loss, loss of strength, numbness, or tremors  MUSCULOSKELETAL: No joint pain or swelling; No muscle, back, or extremity pain      Vital Signs Last 24 Hrs  T(C): 36.7 (20 May 2024 04:53), Max: 37.4 (19 May 2024 22:36)  T(F): 98.1 (20 May 2024 04:53), Max: 99.3 (19 May 2024 22:36)  HR: 69 (20 May 2024 04:53) (65 - 88)  BP: 129/86 (20 May 2024 04:53) (106/75 - 161/70)  BP(mean): --  RR: 18 (20 May 2024 04:53) (16 - 20)  SpO2: 100% (20 May 2024 04:53) (94% - 100%)    Parameters below as of 20 May 2024 04:53  Patient On (Oxygen Delivery Method): BiPAP/CPAP        PHYSICAL EXAM:  GENERAL: NAD,   HEAD:  Atraumatic, Normocephalic  EYES: conjunctiva and sclera clear  NECK: Supple, No JVD  NERVOUS SYSTEM:  Alert & Oriented X3, No gross focal deficits  CHEST/LUNG: Clear to auscultation bilaterally; No rales, rhonchi, wheezing, or rubs  HEART: Regular rate and rhythm; No murmurs, rubs, or gallops  ABDOMEN: Soft, Nontender, Nondistended; Bowel sounds present  EXTREMITIES:  No edema    LABS:                        15.6   7.49  )-----------( 195      ( 20 May 2024 06:02 )             46.1     05-19    132<L>  |  89<L>  |  55.7<H>  ----------------------------<  126<H>  4.4   |  20.0<L>  |  3.97<H>    Ca    9.1      19 May 2024 15:20    TPro  8.7  /  Alb  4.4  /  TBili  0.8  /  DBili  x   /  AST  55<H>  /  ALT  71<H>  /  AlkPhos  84  05-19    PT/INR - ( 20 May 2024 06:02 )   PT: 10.2 sec;   INR: 0.92 ratio           Urinalysis Basic - ( 19 May 2024 15:20 )    Color: x / Appearance: x / SG: x / pH: x  Gluc: 126 mg/dL / Ketone: x  / Bili: x / Urobili: x   Blood: x / Protein: x / Nitrite: x   Leuk Esterase: x / RBC: x / WBC x   Sq Epi: x / Non Sq Epi: x / Bacteria: x      CAPILLARY BLOOD GLUCOSE          RADIOLOGY & ADDITIONAL TESTS:    Imaging Personally Reviewed:  [x ] YES  [ ] NO    Consultant(s) Notes Reviewed:  [ x] YES  [ ] NO    Care Discussed with Consultants/Other Providers [ x] YES  [ ] NO    Plan of Care discussed with Attending [ x]YES [ ] NO Patient is a 48y old  Male who presents with a chief complaint of weakness (20 May 2024 07:40)      INTERVAL HPI/OVERNIGHT EVENTS:    No acute overnight events.  Patient denies chest pain, dyspnea, abdominal pain, fever/chills    MEDICATIONS  (STANDING):  sodium bicarbonate  Infusion 0.188 mEq/kG/Hr (100 mL/Hr) IV Continuous <Continuous>    MEDICATIONS  (PRN):  acetaminophen     Tablet .. 650 milliGRAM(s) Oral every 6 hours PRN Temp greater or equal to 38C (100.4F), Mild Pain (1 - 3)  aluminum hydroxide/magnesium hydroxide/simethicone Suspension 30 milliLiter(s) Oral every 4 hours PRN Dyspepsia  melatonin 3 milliGRAM(s) Oral at bedtime PRN Insomnia  ondansetron Injectable 4 milliGRAM(s) IV Push every 8 hours PRN Nausea and/or Vomiting      Allergies    penicillin (Hives)    Intolerances        REVIEW OF SYSTEMS:  CONSTITUTIONAL: No fever, weight loss, or fatigue  RESPIRATORY: No cough, wheezing, chills or hemoptysis; No shortness of breath  CARDIOVASCULAR: No chest pain, palpitations, dizziness, or leg swelling  GASTROINTESTINAL: No abdominal or epigastric pain. No nausea, vomiting, or hematemesis; No diarrhea or constipation. No melena or hematochezia.  NEUROLOGICAL: No headaches, memory loss, loss of strength, numbness, or tremors  MUSCULOSKELETAL: No joint pain or swelling; No muscle, back, or extremity pain      Vital Signs Last 24 Hrs  T(C): 36.7 (20 May 2024 04:53), Max: 37.4 (19 May 2024 22:36)  T(F): 98.1 (20 May 2024 04:53), Max: 99.3 (19 May 2024 22:36)  HR: 69 (20 May 2024 04:53) (65 - 88)  BP: 129/86 (20 May 2024 04:53) (106/75 - 161/70)  BP(mean): --  RR: 18 (20 May 2024 04:53) (16 - 20)  SpO2: 100% (20 May 2024 04:53) (94% - 100%)    Parameters below as of 20 May 2024 04:53  Patient On (Oxygen Delivery Method): BiPAP/CPAP        PHYSICAL EXAM:  GENERAL: NAD,   HEAD:  Atraumatic, Normocephalic  EYES: conjunctiva and sclera clear  NECK: Supple, No JVD  NERVOUS SYSTEM:  Alert & Oriented X3, No gross focal deficits  CHEST/LUNG: Clear to auscultation bilaterally; No rales, rhonchi, wheezing, or rubs  HEART: Regular rate and rhythm; No murmurs, rubs, or gallops  ABDOMEN: Soft, Nontender, Nondistended; Bowel sounds present  EXTREMITIES:  No edema    LABS:                        15.6   7.49  )-----------( 195      ( 20 May 2024 06:02 )             46.1     05-20    136  |  97  |  39.0<H>  ----------------------------<  122<H>  3.7   |  25.0  |  1.84<H>    Ca    8.2<L>      20 May 2024 06:02    TPro  6.8  /  Alb  3.7  /  TBili  0.7  /  DBili  x   /  AST  41<H>  /  ALT  56<H>  /  AlkPhos  64  05-20    PT/INR - ( 20 May 2024 06:02 )   PT: 10.2 sec;   INR: 0.92 ratio           Urinalysis Basic - ( 20 May 2024 06:02 )    Color: x / Appearance: x / SG: x / pH: x  Gluc: 122 mg/dL / Ketone: x  / Bili: x / Urobili: x   Blood: x / Protein: x / Nitrite: x   Leuk Esterase: x / RBC: x / WBC x   Sq Epi: x / Non Sq Epi: x / Bacteria: x      CAPILLARY BLOOD GLUCOSE          RADIOLOGY & ADDITIONAL TESTS:    Imaging Personally Reviewed:  [x ] YES  [ ] NO    Consultant(s) Notes Reviewed:  [ x] YES  [ ] NO    Care Discussed with Consultants/Other Providers [ x] YES  [ ] NO    Plan of Care discussed with Attending [ x]YES [ ] NO Patient is a 48y old  Male who presents with a chief complaint of weakness (20 May 2024 07:40)      INTERVAL HPI/OVERNIGHT EVENTS:    No acute overnight events. Pt still has multiple episodes diarrhea  Patient denies chest pain, dyspnea, abdominal pain, fever/chills    MEDICATIONS  (STANDING):  sodium bicarbonate  Infusion 0.188 mEq/kG/Hr (100 mL/Hr) IV Continuous <Continuous>    MEDICATIONS  (PRN):  acetaminophen     Tablet .. 650 milliGRAM(s) Oral every 6 hours PRN Temp greater or equal to 38C (100.4F), Mild Pain (1 - 3)  aluminum hydroxide/magnesium hydroxide/simethicone Suspension 30 milliLiter(s) Oral every 4 hours PRN Dyspepsia  melatonin 3 milliGRAM(s) Oral at bedtime PRN Insomnia  ondansetron Injectable 4 milliGRAM(s) IV Push every 8 hours PRN Nausea and/or Vomiting      Allergies    penicillin (Hives)    Intolerances        REVIEW OF SYSTEMS:  CONSTITUTIONAL: No fever, weight loss, or fatigue  RESPIRATORY: No cough, wheezing, chills or hemoptysis; No shortness of breath  CARDIOVASCULAR: No chest pain, palpitations, dizziness, or leg swelling  GASTROINTESTINAL: No abdominal or epigastric pain. No nausea, vomiting, or hematemesis; No diarrhea or constipation. No melena or hematochezia.  NEUROLOGICAL: No headaches, memory loss, loss of strength, numbness, or tremors  MUSCULOSKELETAL: No joint pain or swelling; No muscle, back, or extremity pain      Vital Signs Last 24 Hrs  T(C): 36.7 (20 May 2024 04:53), Max: 37.4 (19 May 2024 22:36)  T(F): 98.1 (20 May 2024 04:53), Max: 99.3 (19 May 2024 22:36)  HR: 69 (20 May 2024 04:53) (65 - 88)  BP: 129/86 (20 May 2024 04:53) (106/75 - 161/70)  BP(mean): --  RR: 18 (20 May 2024 04:53) (16 - 20)  SpO2: 100% (20 May 2024 04:53) (94% - 100%)    Parameters below as of 20 May 2024 04:53  Patient On (Oxygen Delivery Method): BiPAP/CPAP        PHYSICAL EXAM:  GENERAL: NAD,   HEAD:  Atraumatic, Normocephalic  EYES: conjunctiva and sclera clear  NECK: Supple, No JVD  NERVOUS SYSTEM:  Alert & Oriented X3, No gross focal deficits  CHEST/LUNG: Clear to auscultation bilaterally; No rales, rhonchi, wheezing, or rubs  HEART: Regular rate and rhythm; No murmurs, rubs, or gallops  ABDOMEN: Soft, Nontender, Nondistended; Bowel sounds present  EXTREMITIES:  No edema    LABS:                        15.6   7.49  )-----------( 195      ( 20 May 2024 06:02 )             46.1     05-20    136  |  97  |  39.0<H>  ----------------------------<  122<H>  3.7   |  25.0  |  1.84<H>    Ca    8.2<L>      20 May 2024 06:02    TPro  6.8  /  Alb  3.7  /  TBili  0.7  /  DBili  x   /  AST  41<H>  /  ALT  56<H>  /  AlkPhos  64  05-20    PT/INR - ( 20 May 2024 06:02 )   PT: 10.2 sec;   INR: 0.92 ratio           Urinalysis Basic - ( 20 May 2024 06:02 )    Color: x / Appearance: x / SG: x / pH: x  Gluc: 122 mg/dL / Ketone: x  / Bili: x / Urobili: x   Blood: x / Protein: x / Nitrite: x   Leuk Esterase: x / RBC: x / WBC x   Sq Epi: x / Non Sq Epi: x / Bacteria: x      CAPILLARY BLOOD GLUCOSE          RADIOLOGY & ADDITIONAL TESTS:    Imaging Personally Reviewed:  [x ] YES  [ ] NO    Consultant(s) Notes Reviewed:  [ x] YES  [ ] NO    Care Discussed with Consultants/Other Providers [ x] YES  [ ] NO    Plan of Care discussed with Attending [ x]YES [ ] NO Patient is a 48y old  Male who presents with a chief complaint of weakness (20 May 2024 07:40)      INTERVAL HPI/OVERNIGHT EVENTS:    No acute overnight events. Pt still has multiple episodes diarrhea and feeling tenesmus  Patient denies chest pain, dyspnea, fever/chills    MEDICATIONS  (STANDING):  sodium bicarbonate  Infusion 0.188 mEq/kG/Hr (100 mL/Hr) IV Continuous <Continuous>    MEDICATIONS  (PRN):  acetaminophen     Tablet .. 650 milliGRAM(s) Oral every 6 hours PRN Temp greater or equal to 38C (100.4F), Mild Pain (1 - 3)  aluminum hydroxide/magnesium hydroxide/simethicone Suspension 30 milliLiter(s) Oral every 4 hours PRN Dyspepsia  melatonin 3 milliGRAM(s) Oral at bedtime PRN Insomnia  ondansetron Injectable 4 milliGRAM(s) IV Push every 8 hours PRN Nausea and/or Vomiting      Allergies    penicillin (Hives)    Intolerances        REVIEW OF SYSTEMS:  CONSTITUTIONAL: No fever, weight loss, or fatigue  RESPIRATORY: No cough, wheezing, chills or hemoptysis; No shortness of breath  CARDIOVASCULAR: No chest pain, palpitations, dizziness, or leg swelling  GASTROINTESTINAL: No abdominal or epigastric pain. No nausea, vomiting, or hematemesis; +diarrhea. No melena or hematochezia.  NEUROLOGICAL: No headaches, memory loss, loss of strength, numbness, or tremors  MUSCULOSKELETAL: No joint pain or swelling; No muscle, back, or extremity pain      Vital Signs Last 24 Hrs  T(C): 36.7 (20 May 2024 04:53), Max: 37.4 (19 May 2024 22:36)  T(F): 98.1 (20 May 2024 04:53), Max: 99.3 (19 May 2024 22:36)  HR: 69 (20 May 2024 04:53) (65 - 88)  BP: 129/86 (20 May 2024 04:53) (106/75 - 161/70)  BP(mean): --  RR: 18 (20 May 2024 04:53) (16 - 20)  SpO2: 100% (20 May 2024 04:53) (94% - 100%)    Parameters below as of 20 May 2024 04:53  Patient On (Oxygen Delivery Method): BiPAP/CPAP        PHYSICAL EXAM:  GENERAL: NAD, resting comfortably, appear euvolemic  HEAD:  Atraumatic, Normocephalic  EYES: conjunctiva and sclera clear  NECK: Supple, No JVD  NERVOUS SYSTEM:  Alert & Oriented X3, No gross focal deficits  CHEST/LUNG: Clear to auscultation bilaterally; No rales, rhonchi, wheezing, or rubs  HEART: Regular rate and rhythm; No murmurs, rubs, or gallops  ABDOMEN: Soft, Nontender, Nondistended; Bowel sounds present  EXTREMITIES:  No edema    LABS:                        15.6   7.49  )-----------( 195      ( 20 May 2024 06:02 )             46.1     05-20    136  |  97  |  39.0<H>  ----------------------------<  122<H>  3.7   |  25.0  |  1.84<H>    Ca    8.2<L>      20 May 2024 06:02    TPro  6.8  /  Alb  3.7  /  TBili  0.7  /  DBili  x   /  AST  41<H>  /  ALT  56<H>  /  AlkPhos  64  05-20    PT/INR - ( 20 May 2024 06:02 )   PT: 10.2 sec;   INR: 0.92 ratio           Urinalysis Basic - ( 20 May 2024 06:02 )    Color: x / Appearance: x / SG: x / pH: x  Gluc: 122 mg/dL / Ketone: x  / Bili: x / Urobili: x   Blood: x / Protein: x / Nitrite: x   Leuk Esterase: x / RBC: x / WBC x   Sq Epi: x / Non Sq Epi: x / Bacteria: x      CAPILLARY BLOOD GLUCOSE          RADIOLOGY & ADDITIONAL TESTS:    Consultant(s) Notes Reviewed:  [ x] YES  [ ] NO    Care Discussed with Consultants/Other Providers [ x] YES  [ ] NO    Plan of Care discussed with Attending [ x]YES [ ] NO

## 2024-05-20 NOTE — PROGRESS NOTE ADULT - ASSESSMENT
· Assessment	  48 yr old male with WILFREDO on CPAP presents with complaints of weakness and diarrhea for 4 days. admitted for flu +, acute renal failure. Labs reveal elevated Hb, creatinine 3.9 with HCO3 20. Elevated LFTs.    1. JOSIE with metabolic acidosis sec dehydration, pre renal:  Continue IVF with bicarb  no retention on bladder scan  monitor renal function  nephrology eval  renal US    2. Diarrhea:  Likely in setting viral illness  check GI PCR given travel  continue IVF    3. WILFREDO on CPAP:  CPAP ordered    4. DVT ppx:  SCDs    Discussed with patient and RN.    · Assessment	  48 yr old male with WILFREDO on CPAP presents with complaints of weakness and diarrhea for 4 days. admitted for flu +, acute renal failure. Labs reveal elevated Hb, creatinine 3.9 with HCO3 20. Elevated LFTs.    1. JOSIE with metabolic acidosis:  Greatly improved s/p ivf. Suspect pre-renal given severely hypovolemic on presentation. no retention on bladder scan  Continue IVF with bicarb  monitor renal function  nephrology eval  renal US    2. Diarrhea:  Likely in setting viral illness  check GI PCR given travel  continue IVF    3. WILFREDO on CPAP:  CPAP ordered    4. DVT ppx:  SCDs    Discussed with patient and RN.    · Assessment	  48 yr old male with WILFREDO on CPAP presents with complaints of weakness and diarrhea for 4 days. admitted for flu +, acute renal failure. Labs reveal elevated Hb, creatinine 3.9 with HCO3 20. Elevated LFTs.    1. JOSIE with metabolic acidosis:  Greatly improved s/p ivf but not normal yet. Suspect pre-renal given severely hypovolemic on presentation. no retention on bladder scan  Continue IVF given poor appetite and cr , stopped bicarb  monitor renal function  nephrology consult  - pend renal US and ua    2. Diarrhea:  Likely in setting viral illness  pending GI PCR/ ova parasite/ stool culture/ c dif given travel  continue IVF    3. Influenza A  past time period for tamiflu    3. WILFREDO on CPAP:  CPAP ordered    4. DVT ppx:  SCDs    Discussed with patient and RN.

## 2024-05-21 LAB
ANION GAP SERPL CALC-SCNC: 14 MMOL/L — SIGNIFICANT CHANGE UP (ref 5–17)
BUN SERPL-MCNC: 20.6 MG/DL — HIGH (ref 8–20)
CALCIUM SERPL-MCNC: 8.3 MG/DL — LOW (ref 8.4–10.5)
CHLORIDE SERPL-SCNC: 98 MMOL/L — SIGNIFICANT CHANGE UP (ref 96–108)
CO2 SERPL-SCNC: 26 MMOL/L — SIGNIFICANT CHANGE UP (ref 22–29)
CREAT SERPL-MCNC: 1.18 MG/DL — SIGNIFICANT CHANGE UP (ref 0.5–1.3)
EGFR: 76 ML/MIN/1.73M2 — SIGNIFICANT CHANGE UP
GLUCOSE SERPL-MCNC: 99 MG/DL — SIGNIFICANT CHANGE UP (ref 70–99)
MAGNESIUM SERPL-MCNC: 2.5 MG/DL — SIGNIFICANT CHANGE UP (ref 1.6–2.6)
PHOSPHATE SERPL-MCNC: 2.3 MG/DL — LOW (ref 2.4–4.7)
POTASSIUM SERPL-MCNC: 3.6 MMOL/L — SIGNIFICANT CHANGE UP (ref 3.5–5.3)
POTASSIUM SERPL-SCNC: 3.6 MMOL/L — SIGNIFICANT CHANGE UP (ref 3.5–5.3)
SODIUM SERPL-SCNC: 138 MMOL/L — SIGNIFICANT CHANGE UP (ref 135–145)

## 2024-05-21 PROCEDURE — 99233 SBSQ HOSP IP/OBS HIGH 50: CPT | Mod: GC

## 2024-05-21 PROCEDURE — 99223 1ST HOSP IP/OBS HIGH 75: CPT

## 2024-05-21 RX ORDER — CIPROFLOXACIN LACTATE 400MG/40ML
500 VIAL (ML) INTRAVENOUS EVERY 12 HOURS
Refills: 0 | Status: DISCONTINUED | OUTPATIENT
Start: 2024-05-21 | End: 2024-05-21

## 2024-05-21 RX ORDER — CEFTRIAXONE 500 MG/1
2000 INJECTION, POWDER, FOR SOLUTION INTRAMUSCULAR; INTRAVENOUS EVERY 24 HOURS
Refills: 0 | Status: DISCONTINUED | OUTPATIENT
Start: 2024-05-21 | End: 2024-05-27

## 2024-05-21 RX ORDER — METRONIDAZOLE 500 MG
500 TABLET ORAL THREE TIMES A DAY
Refills: 0 | Status: DISCONTINUED | OUTPATIENT
Start: 2024-05-21 | End: 2024-05-27

## 2024-05-21 RX ADMIN — ONDANSETRON 4 MILLIGRAM(S): 8 TABLET, FILM COATED ORAL at 16:29

## 2024-05-21 RX ADMIN — Medication 500 MILLIGRAM(S): at 16:10

## 2024-05-21 RX ADMIN — CEFTRIAXONE 2000 MILLIGRAM(S): 500 INJECTION, POWDER, FOR SOLUTION INTRAMUSCULAR; INTRAVENOUS at 16:10

## 2024-05-21 RX ADMIN — SODIUM CHLORIDE 100 MILLILITER(S): 9 INJECTION, SOLUTION INTRAVENOUS at 10:55

## 2024-05-21 NOTE — PROGRESS NOTE ADULT - SUBJECTIVE AND OBJECTIVE BOX
NEPHROLOGY INTERVAL HPI/OVERNIGHT EVENTS:  pt more comfortable today  still with loose BMs    MEDICATIONS  (STANDING):  lactated ringers. 1000 milliLiter(s) (100 mL/Hr) IV Continuous <Continuous>    MEDICATIONS  (PRN):  acetaminophen     Tablet .. 650 milliGRAM(s) Oral every 6 hours PRN Temp greater or equal to 38C (100.4F), Mild Pain (1 - 3)  aluminum hydroxide/magnesium hydroxide/simethicone Suspension 30 milliLiter(s) Oral every 4 hours PRN Dyspepsia  melatonin 3 milliGRAM(s) Oral at bedtime PRN Insomnia  ondansetron Injectable 4 milliGRAM(s) IV Push every 8 hours PRN Nausea and/or Vomiting      Allergies    penicillin (Hives)        Vital Signs Last 24 Hrs  T(C): 36.8 (21 May 2024 09:00), Max: 37.2 (20 May 2024 16:52)  T(F): 98.2 (21 May 2024 09:00), Max: 99 (20 May 2024 16:52)  HR: 58 (21 May 2024 09:00) (57 - 67)  BP: 117/66 (21 May 2024 09:00) (116/61 - 142/72)  BP(mean): --  RR: 18 (21 May 2024 09:00) (18 - 18)  SpO2: 98% (21 May 2024 09:00) (96% - 98%)    Parameters below as of 21 May 2024 09:00  Patient On (Oxygen Delivery Method): room air        PHYSICAL EXAM:  GENERAL: Fatigued  HEENMT: No periorbital edema  NECK: Supple, No JVD  NERVOUS SYSTEM:  Alert & Oriented X3, intact and symmetric  CHEST/LUNG: Clear bilaterally  HEART: Regular rate and rhythm; No rub  ABDOMEN: Soft, +BS  EXTREMITIES:  No dependent edema  SKIN: No rashes or lesions      LABS:                        15.6   7.49  )-----------( 195      ( 20 May 2024 06:02 )             46.1     05-21    138  |  98  |  20.6<H>  ----------------------------<  99  3.6   |  26.0  |  1.18    Ca    8.3<L>      21 May 2024 05:45  Phos  2.3     05-21  Mg     2.5     05-21    TPro  6.7  /  Alb  3.5  /  TBili  0.7  /  DBili  x   /  AST  38  /  ALT  55<H>  /  AlkPhos  65  05-20    PT/INR - ( 20 May 2024 06:02 )   PT: 10.2 sec;   INR: 0.92 ratio           Urinalysis Basic - ( 21 May 2024 05:45 )    Color: x / Appearance: x / SG: x / pH: x  Gluc: 99 mg/dL / Ketone: x  / Bili: x / Urobili: x   Blood: x / Protein: x / Nitrite: x   Leuk Esterase: x / RBC: x / WBC x   Sq Epi: x / Non Sq Epi: x / Bacteria: x      Phosphorus: 2.3 mg/dL (05-21 @ 05:45)  Magnesium: 2.5 mg/dL (05-21 @ 05:45)      RADIOLOGY & ADDITIONAL TESTS:  < from: US Renal (05.20.24 @ 11:35) >  ACC: [id number] EXAM:  US KIDNEY(S)   ORDERED BY: EDUARDO ELMORE     PROCEDURE DATE:  05/20/2024          INTERPRETATION:  CLINICAL INFORMATION: Acute kidney injury    COMPARISON: None available.    TECHNIQUE: Sonography of the kidneys and bladder.    FINDINGS:  Right kidney: 11.7 cm. No renal mass, hydronephrosis or calculi.    Left kidney: 12.0 cm. No renal mass, hydronephrosis or calculi.    Urinary bladder:  Bladder wall appears circumferentially thickened may be due to incomplete   distention. Correlate with symptomatology urinalysis for cystitis.    IMPRESSION:  No hydronephrosis bilaterally.  Apparent bladder wall thickening may reflect underdistention. Correlate   with urinalysis for cystitis    < end of copied text >

## 2024-05-21 NOTE — PROGRESS NOTE ADULT - ASSESSMENT
JOSIE: vol depletion from GI losses (salmonella)---> slowly resolvingt  Influenza A+  - cont IVF  - supportive care  - trend labs

## 2024-05-21 NOTE — CONSULT NOTE ADULT - TIME BILLING
Chart/notes review, interpretation of laboratory and imaging results,  patient assessment, documentation, case discussion with interdisciplinary team and family  Review of microbiologic data with clinical pharmacist and adjusting antibiotics

## 2024-05-21 NOTE — CONSULT NOTE ADULT - SUBJECTIVE AND OBJECTIVE BOX
[facility]                                                INFECTIOUS DISEASES  =======================================================                     Anson Larry#   Avila Hunt MD#   Dillon Bashir MD*                           Jena Mehta MD*   Mirna Beckman MD*            Diplomates American Board of Internal Medicine & Infectious Diseases                  # [place] Office - Appt - Tel  [phone] Fax [phone]                * [place] Office - Appt - Tel [phone] Fax [phone]                                  Hospital Consult line:  [phone]  =======================================================      [id number]  EDDIE PIÑA   HPI:  48 yr old male with WILFREDO on CPAP presents with complaints of weakness and diarrhea for 4 days. States he was visiting [place] for 7 days, returned on Thursday. He reports eating a local delicacy prior to his flight. He reports he felt sick and sore on his flight back, started to have vomiting and multiple episodes of watery stools with some runny nose. He went to urgent care on Friday and was told he has Influenza, was prescribed Tamiflu and Clindamycin, which he did not take. He continued to have poor oral intake, with ongoing vomiting and diarrhea since. He felt cramps in his LE today and felt dehydrated hence came in for evaluation. Felt chills, fever, took one Ibuprofen 800 mg yesterday. No headaches, abdominal pain. (19 May 2024 17:07)          I have personally reviewed the labs and data; pertinent labs and data are listed in this note; please see below.   =======================================================  Past Medical & Surgical Hx:  =====================  PAST MEDICAL & SURGICAL HISTORY:  No pertinent past medical history      No pertinent past medical history      WILFREDO on CPAP      History of pelvic fracture  screw and plates present      H/O discectomy  micro L1-L4        Problem List:  ==========  HEALTH ISSUES - PROBLEM Dx:        Social Hx:  =======  no toxic habits currently    FAMILY HISTORY:  FH: diabetes mellitus (Father, Mother)    no significant family history of immunosuppressive disorders in mother or father   =======================================================    REVIEW OF SYSTEMS:  CONSTITUTIONAL:  No Fever or chills  HEENT:  No diplopia or blurred vision.  No earache, sore throat or runny nose.  CARDIOVASCULAR:  No pressure, squeezing, strangling, tightness, heaviness or aching about the chest, neck, axilla or epigastrium.  RESPIRATORY:  No cough, shortness of breath  GASTROINTESTINAL:  No nausea, vomiting or diarrhea.  GENITOURINARY:  No dysuria, frequency or urgency. No Blood in urine  MUSCULOSKELETAL:  no joint aches, no muscle pain  SKIN:  No change in skin, hair or nails.  NEUROLOGIC:  No Headaches, seizures or weakness.  PSYCHIATRIC:  No disorder of thought or mood.  ENDOCRINE:  No heat or cold intolerance  HEMATOLOGICAL:  No easy bruising or bleeding.    =======================================================  Allergies    penicillin (Hives)    Intolerances    Antibiotics:  cefTRIAXone Injectable. 2000 milliGRAM(s) IV Push every 24 hours  metroNIDAZOLE    Tablet 500 milliGRAM(s) Oral three times a day    Other medications:  lactated ringers. 1000 milliLiter(s) IV Continuous <Continuous>       ======================================================  Physical Exam:  ============  T(F): 98.2 (21 May 2024 09:00), Max: 99 (20 May 2024 16:52)  HR: 58 (21 May 2024 09:00)  BP: 117/66 (21 May 2024 09:00)  RR: 18 (21 May 2024 09:00)  SpO2: 98% (21 May 2024 09:00) (96% - 98%)  temp max in last 48H T(F): , Max: 99.3 (05-19-24 @ 22:36)    General:  No acute distress.  Eye: Pupils are equal, round and reactive to light, Normal conjunctiva.  HENT: Normocephalic, Oral mucosa is moist, No pharyngeal erythema, No sinus tenderness.  Neck: Supple, No lymphadenopathy.  Respiratory: Lungs are clear to auscultation, Respirations are non-labored.  Cardiovascular: Normal rate, Regular rhythm, s1 + s2  Gastrointestinal: Soft, Non-tender, Non-distended, Normal bowel sounds.  Genitourinary: No costovertebral angle tenderness.  Lymphatics: No lymphadenopathy neck,   Musculoskeletal: Normal range of motion, Normal strength.  Integumentary: No rash.  Neurologic: Alert, Oriented, No focal deficits  Psychiatric: Appropriate mood & affect.    =======================================================  Labs:                        15.6   7.49  )-----------( 195      ( 20 May 2024 06:02 )             46.1     05-21    138  |  98  |  20.6<H>  ----------------------------<  99  3.6   |  26.0  |  1.18    Ca    8.3<L>      21 May 2024 05:45  Phos  2.3     05-21  Mg     2.5     05-21    TPro  6.7  /  Alb  3.5  /  TBili  0.7  /  DBili  x   /  AST  38  /  ALT  55<H>  /  AlkPhos  65  05-20       SARS-CoV-2 Result: NotDetec (05-19-24 @ 17:00)                                                   [facility]                                                INFECTIOUS DISEASES  =======================================================                     Anson Larry#   Avila Hunt MD#   Dillon Bashir MD*                           Jena Mehta MD*   Mirna Beckman MD*            Diplomates American Board of Internal Medicine & Infectious Diseases                  # [place] Office - Appt - Tel  [phone] Fax [phone]                * [place] Office - Appt - Tel [phone] Fax [phone]                                  Hospital Consult line:  [phone]  =======================================================      [id number]  EDDIE PIÑA   HPI:  48 yr old male with WILFREDO on CPAP presents with complaints of weakness and diarrhea for 4 days. States he was visiting [place] for 7 days, returned on Thursday. He reports eating a local delicacy prior to his flight. He reports he felt sick and sore on his flight back, started to have vomiting and multiple episodes of watery stools with some runny nose. He went to urgent care on Friday and was told he has Influenza, was prescribed Tamiflu and Clindamycin, which he did not take. He continued to have poor oral intake, with ongoing vomiting and diarrhea since. He felt cramps in his LE today and felt dehydrated hence came in for evaluation. Felt chills, fever, took one Ibuprofen 800 mg yesterday. No headaches, abdominal pain. (19 May 2024 17:07)          I have personally reviewed the labs and data; pertinent labs and data are listed in this note; please see below.   =======================================================  Past Medical & Surgical Hx:  =====================  PAST MEDICAL & SURGICAL HISTORY:  No pertinent past medical history      No pertinent past medical history      WILFREDO on CPAP      History of pelvic fracture  screw and plates present      H/O discectomy  micro L1-L4        Problem List:  ==========  HEALTH ISSUES - PROBLEM Dx:        Social Hx:  =======  no toxic habits currently    FAMILY HISTORY:  FH: diabetes mellitus (Father, Mother)    no significant family history of immunosuppressive disorders in mother or father   =======================================================    REVIEW OF SYSTEMS:  CONSTITUTIONAL:  No Fever or chills  HEENT:  No diplopia or blurred vision.  No earache, sore throat or runny nose.  CARDIOVASCULAR:  No pressure, squeezing, strangling, tightness, heaviness or aching about the chest, neck, axilla or epigastrium.  RESPIRATORY:  No cough, shortness of breath  GASTROINTESTINAL:  No nausea, vomiting or diarrhea.  GENITOURINARY:  No dysuria, frequency or urgency. No Blood in urine  MUSCULOSKELETAL:  no joint aches, no muscle pain  SKIN:  No change in skin, hair or nails.  NEUROLOGIC:  No Headaches, seizures or weakness.  PSYCHIATRIC:  No disorder of thought or mood.  ENDOCRINE:  No heat or cold intolerance  HEMATOLOGICAL:  No easy bruising or bleeding.    =======================================================  Allergies    penicillin (Hives)    Intolerances    Antibiotics:  cefTRIAXone Injectable. 2000 milliGRAM(s) IV Push every 24 hours  metroNIDAZOLE    Tablet 500 milliGRAM(s) Oral three times a day    Other medications:  lactated ringers. 1000 milliLiter(s) IV Continuous <Continuous>       ======================================================  Physical Exam:  ============  T(F): 98.2 (21 May 2024 09:00), Max: 99 (20 May 2024 16:52)  HR: 58 (21 May 2024 09:00)  BP: 117/66 (21 May 2024 09:00)  RR: 18 (21 May 2024 09:00)  SpO2: 98% (21 May 2024 09:00) (96% - 98%)  temp max in last 48H T(F): , Max: 99.3 (05-19-24 @ 22:36)    General:  No acute distress.  Eye: Normal conjunctiva.  Neck: Supple, No lymphadenopathy.  Respiratory: Lungs are clear to auscultation, Respirations are non-labored.  Cardiovascular: Normal rate, Regular rhythm, s1 + s2  Gastrointestinal: Soft, + tender more on llqNon-distended, Normal bowel sounds.  Genitourinary: No costovertebral angle tenderness.  Integumentary: No rash.  Neurologic: Alert, Oriented, No focal deficits  Psychiatric: Appropriate mood & affect.    =======================================================  Labs:                        15.6   7.49  )-----------( 195      ( 20 May 2024 06:02 )             46.1     05-21    138  |  98  |  20.6<H>  ----------------------------<  99  3.6   |  26.0  |  1.18    Ca    8.3<L>      21 May 2024 05:45  Phos  2.3     05-21  Mg     2.5     05-21    TPro  6.7  /  Alb  3.5  /  TBili  0.7  /  DBili  x   /  AST  38  /  ALT  55<H>  /  AlkPhos  65  05-20       SARS-CoV-2 Result: NotDetec (05-19-24 @ 17:00)

## 2024-05-21 NOTE — PROGRESS NOTE ADULT - SUBJECTIVE AND OBJECTIVE BOX
Patient is a 48y old  Male who presents with a chief complaint of weakness (20 May 2024 10:29)      INTERVAL HPI/OVERNIGHT EVENTS:    No acute overnight events.  Patient denies chest pain, dyspnea, abdominal pain, fever/chills    MEDICATIONS  (STANDING):  lactated ringers. 1000 milliLiter(s) (100 mL/Hr) IV Continuous <Continuous>    MEDICATIONS  (PRN):  acetaminophen     Tablet .. 650 milliGRAM(s) Oral every 6 hours PRN Temp greater or equal to 38C (100.4F), Mild Pain (1 - 3)  aluminum hydroxide/magnesium hydroxide/simethicone Suspension 30 milliLiter(s) Oral every 4 hours PRN Dyspepsia  melatonin 3 milliGRAM(s) Oral at bedtime PRN Insomnia  ondansetron Injectable 4 milliGRAM(s) IV Push every 8 hours PRN Nausea and/or Vomiting      Allergies    penicillin (Hives)    Intolerances        REVIEW OF SYSTEMS:  CONSTITUTIONAL: No fever, weight loss, or fatigue  RESPIRATORY: No cough, wheezing, chills or hemoptysis; No shortness of breath  CARDIOVASCULAR: No chest pain, palpitations, dizziness, or leg swelling  GASTROINTESTINAL: No abdominal or epigastric pain. No nausea, vomiting, or hematemesis; No diarrhea or constipation. No melena or hematochezia.  NEUROLOGICAL: No headaches, memory loss, loss of strength, numbness, or tremors  MUSCULOSKELETAL: No joint pain or swelling; No muscle, back, or extremity pain      Vital Signs Last 24 Hrs  T(C): 36.8 (21 May 2024 04:57), Max: 37.2 (20 May 2024 16:52)  T(F): 98.2 (21 May 2024 04:57), Max: 99 (20 May 2024 16:52)  HR: 57 (21 May 2024 04:57) (57 - 67)  BP: 116/61 (21 May 2024 04:57) (109/65 - 142/72)  BP(mean): --  RR: 18 (21 May 2024 04:57) (17 - 18)  SpO2: 96% (21 May 2024 04:57) (96% - 98%)    Parameters below as of 21 May 2024 04:57  Patient On (Oxygen Delivery Method): room air        PHYSICAL EXAM:  GENERAL: NAD,   HEAD:  Atraumatic, Normocephalic  EYES: conjunctiva and sclera clear  NECK: Supple, No JVD  NERVOUS SYSTEM:  Alert & Oriented X3, No gross focal deficits  CHEST/LUNG: Clear to auscultation bilaterally; No rales, rhonchi, wheezing, or rubs  HEART: Regular rate and rhythm; No murmurs, rubs, or gallops  ABDOMEN: Soft, Nontender, Nondistended; Bowel sounds present  EXTREMITIES:  No edema    LABS:                        15.6   7.49  )-----------( 195      ( 20 May 2024 06:02 )             46.1     05-21    138  |  98  |  20.6<H>  ----------------------------<  99  3.6   |  26.0  |  1.18    Ca    8.3<L>      21 May 2024 05:45  Phos  2.3     05-21  Mg     2.5     05-21    TPro  6.7  /  Alb  3.5  /  TBili  0.7  /  DBili  x   /  AST  38  /  ALT  55<H>  /  AlkPhos  65  05-20    PT/INR - ( 20 May 2024 06:02 )   PT: 10.2 sec;   INR: 0.92 ratio           Urinalysis Basic - ( 21 May 2024 05:45 )    Color: x / Appearance: x / SG: x / pH: x  Gluc: 99 mg/dL / Ketone: x  / Bili: x / Urobili: x   Blood: x / Protein: x / Nitrite: x   Leuk Esterase: x / RBC: x / WBC x   Sq Epi: x / Non Sq Epi: x / Bacteria: x      CAPILLARY BLOOD GLUCOSE          RADIOLOGY & ADDITIONAL TESTS:    Imaging Personally Reviewed:  [x ] YES  [ ] NO    Consultant(s) Notes Reviewed:  [ x] YES  [ ] NO    Care Discussed with Consultants/Other Providers [ x] YES  [ ] NO    Plan of Care discussed with Attending [ x]YES [ ] NO Patient is a 48y old  Male who presents with a chief complaint of weakness (20 May 2024 10:29)      INTERVAL HPI/OVERNIGHT EVENTS:    No acute overnight events. + 10 episodes diarrhea overnight, tenesmus. Poor appetite still. no rashes.  Patient denies chest pain, dyspnea, fever/chills    MEDICATIONS  (STANDING):  lactated ringers. 1000 milliLiter(s) (100 mL/Hr) IV Continuous <Continuous>    MEDICATIONS  (PRN):  acetaminophen     Tablet .. 650 milliGRAM(s) Oral every 6 hours PRN Temp greater or equal to 38C (100.4F), Mild Pain (1 - 3)  aluminum hydroxide/magnesium hydroxide/simethicone Suspension 30 milliLiter(s) Oral every 4 hours PRN Dyspepsia  melatonin 3 milliGRAM(s) Oral at bedtime PRN Insomnia  ondansetron Injectable 4 milliGRAM(s) IV Push every 8 hours PRN Nausea and/or Vomiting      Allergies    penicillin (Hives)    Intolerances        REVIEW OF SYSTEMS:  CONSTITUTIONAL: No fever, weight loss, or fatigue  RESPIRATORY: No cough, wheezing, chills or hemoptysis; No shortness of breath  CARDIOVASCULAR: No chest pain, palpitations, dizziness, or leg swelling  GASTROINTESTINAL: No nausea, vomiting, or hematemesis; +diarrhea No melena or hematochezia.  NEUROLOGICAL: No headaches, memory loss, loss of strength, numbness, or tremors  MUSCULOSKELETAL: No joint pain or swelling; No muscle, back, or extremity pain      Vital Signs Last 24 Hrs  T(C): 36.8 (21 May 2024 04:57), Max: 37.2 (20 May 2024 16:52)  T(F): 98.2 (21 May 2024 04:57), Max: 99 (20 May 2024 16:52)  HR: 57 (21 May 2024 04:57) (57 - 67)  BP: 116/61 (21 May 2024 04:57) (109/65 - 142/72)  BP(mean): --  RR: 18 (21 May 2024 04:57) (17 - 18)  SpO2: 96% (21 May 2024 04:57) (96% - 98%)    Parameters below as of 21 May 2024 04:57  Patient On (Oxygen Delivery Method): room air        PHYSICAL EXAM:  GENERAL: NAD,   HEAD:  Atraumatic, Normocephalic  EYES: conjunctiva and sclera clear  NECK: Supple, No JVD  NERVOUS SYSTEM:  Alert & Oriented X3, No gross focal deficits  CHEST/LUNG: Clear to auscultation bilaterally; No rales, rhonchi, wheezing, or rubs  HEART: Regular rate and rhythm; No murmurs, rubs, or gallops  ABDOMEN: Soft, Nontender, Nondistended; Bowel sounds present  EXTREMITIES:  No edema    LABS:                        15.6   7.49  )-----------( 195      ( 20 May 2024 06:02 )             46.1     05-21    138  |  98  |  20.6<H>  ----------------------------<  99  3.6   |  26.0  |  1.18    Ca    8.3<L>      21 May 2024 05:45  Phos  2.3     05-21  Mg     2.5     05-21    TPro  6.7  /  Alb  3.5  /  TBili  0.7  /  DBili  x   /  AST  38  /  ALT  55<H>  /  AlkPhos  65  05-20    PT/INR - ( 20 May 2024 06:02 )   PT: 10.2 sec;   INR: 0.92 ratio           Urinalysis Basic - ( 21 May 2024 05:45 )    Color: x / Appearance: x / SG: x / pH: x  Gluc: 99 mg/dL / Ketone: x  / Bili: x / Urobili: x   Blood: x / Protein: x / Nitrite: x   Leuk Esterase: x / RBC: x / WBC x   Sq Epi: x / Non Sq Epi: x / Bacteria: x      CAPILLARY BLOOD GLUCOSE          RADIOLOGY & ADDITIONAL TESTS:    Consultant(s) Notes Reviewed:  [ x] YES  [ ] NO    Care Discussed with Consultants/Other Providers [ x] YES  [ ] NO    Plan of Care discussed with Attending [ x]YES [ ] NO

## 2024-05-21 NOTE — PROGRESS NOTE ADULT - ASSESSMENT
48 yr old male with WILFREDO on CPAP presents with complaints of weakness and diarrhea for 4 days. admitted for flu +, acute renal failure. Labs reveal elevated Hb, creatinine 3.9 with HCO3 20. Elevated LFTs.    1. JOSIE with metabolic acidosis:  Greatly improved s/p ivf but not normal yet. Suspect pre-renal given severely hypovolemic on presentation. no retention on bladder scan  Continue IVF given poor appetite and cr , stopped bicarb  monitor renal function  nephrology consult  - pend renal US and ua    2. Diarrhea:  GI PCR with salmonella, e coli- epec and eaec  ova parasite/ stool culture/ c dif given travel  continue IVF    3. Influenza A  past time period for tamiflu    3. WILFREDO on CPAP:  CPAP ordered    4. DVT ppx:  SCDs    Discussed with patient and RN.  48 yr old male with WILFREDO on CPAP presents with complaints of weakness and diarrhea for 4 days. admitted for flu +, acute renal failure. Labs reveal elevated Hb, creatinine 3.9 with HCO3 20. Elevated LFTs.     Diarrhea 2/2  Bacterial Gastroenteritis, recent travel to [place]  Still with diarrhea and poor appetite  GI PCR with salmonella, e coli- epec and eaec  - added po cipro 500 bid and flagyl 500 tid  pend ova parasite/ stool culture  continue IVF  - blood cultures pending  - id consult    JOSIE with metabolic acidosis, resolved  pre-renal in the setting of bacterial gastroenteritis normalized s/p ivf. US without acute findings of hydronephrosis or mass.  Continue IVF given poor appetite  monitor renal function  nephrology consult    Influenza A  past time period for tamiflu     WILFREDO on CPAP:  CPAP ordered     DVT ppx:  SCDs    Discussed with patient and RN.

## 2024-05-21 NOTE — CONSULT NOTE ADULT - ASSESSMENT
JOSIE: vol depletion from GI losses==> improving post IVF hydration  Pt unaware of pre-existing renal disease  Influenza A+  - cont IVF  - check UA and renal sonogram  - supportive care
48 yr old male with WILFREDO on CPAP presents with complaints of weakness and diarrhea for 4 days. States [place] was [place], returned on Thursday. He reports eating a local delicacy prior to his flight. He reports he felt sick and sore on his flight back, started to have vomiting and multiple episodes of watery stools with some runny nose. He went to urgent care on Friday and was told he has Influenza, was prescribed Tamiflu and Clindamycin, which he did not take. He continued to have poor oral intake, with ongoing vomiting and diarrhea since. He felt cramps in his LE today and felt dehydrated hence came in for evaluation. Felt chills, fever, took one Ibuprofen 800 mg yesterday. No headaches, abdominal pain. (19 May 2024 17:07)    Salmonella Gastroenteritis  Bandemia  Influenza A  Penicillin allergy-hives as a baby  s/p JOSIE    - persistent diarrhea and abdominal cramping. patient had "blood sausage" in [place] on 5[place]16 and symptoms started later that night. had drinks made with ice otherwise had bottled watter  - GI PCR + salmonella, epec EAEC  - f/u salmonella ID+S on stool culture  - check BCX  - dc cipro-->ceftriaxone. pt has tolerated amoxicillin before  - Continue flagyl  - defer tamiflu-out of the window  - Trend Fever  - Trend WBC   - droplet iso for influenza per infection control    d/w Dr Power, pharmacy  Will Follow

## 2024-05-22 LAB
CULTURE RESULTS: ABNORMAL
CULTURE RESULTS: ABNORMAL
CULTURE RESULTS: SIGNIFICANT CHANGE UP
GRAM STN FLD: ABNORMAL
GRAM STN FLD: ABNORMAL
METHOD TYPE: SIGNIFICANT CHANGE UP
SALMONELLA DNA BLD POS QL NAA+NON-PROBE: SIGNIFICANT CHANGE UP
SPECIMEN SOURCE: SIGNIFICANT CHANGE UP

## 2024-05-22 PROCEDURE — 99233 SBSQ HOSP IP/OBS HIGH 50: CPT | Mod: GC

## 2024-05-22 PROCEDURE — 99232 SBSQ HOSP IP/OBS MODERATE 35: CPT

## 2024-05-22 RX ORDER — HEPARIN SODIUM 5000 [USP'U]/ML
5000 INJECTION INTRAVENOUS; SUBCUTANEOUS EVERY 8 HOURS
Refills: 0 | Status: DISCONTINUED | OUTPATIENT
Start: 2024-05-22 | End: 2024-05-27

## 2024-05-22 RX ADMIN — Medication 3 MILLIGRAM(S): at 22:28

## 2024-05-22 RX ADMIN — SODIUM CHLORIDE 100 MILLILITER(S): 9 INJECTION, SOLUTION INTRAVENOUS at 11:44

## 2024-05-22 RX ADMIN — ONDANSETRON 4 MILLIGRAM(S): 8 TABLET, FILM COATED ORAL at 14:26

## 2024-05-22 RX ADMIN — Medication 500 MILLIGRAM(S): at 00:38

## 2024-05-22 RX ADMIN — Medication 500 MILLIGRAM(S): at 06:44

## 2024-05-22 RX ADMIN — CEFTRIAXONE 2000 MILLIGRAM(S): 500 INJECTION, POWDER, FOR SOLUTION INTRAMUSCULAR; INTRAVENOUS at 14:26

## 2024-05-22 RX ADMIN — SODIUM CHLORIDE 100 MILLILITER(S): 9 INJECTION, SOLUTION INTRAVENOUS at 22:28

## 2024-05-22 RX ADMIN — SODIUM CHLORIDE 100 MILLILITER(S): 9 INJECTION, SOLUTION INTRAVENOUS at 00:38

## 2024-05-22 RX ADMIN — Medication 500 MILLIGRAM(S): at 14:26

## 2024-05-22 RX ADMIN — Medication 500 MILLIGRAM(S): at 22:28

## 2024-05-22 NOTE — PROVIDER CONTACT NOTE (CRITICAL VALUE NOTIFICATION) - TEST AND RESULT REPORTED:
GI PCR positive for Salmonella,  enteroaggregative e.coli, and enteropathogenic e.coli
Blood culture- positive for gram negative rods in aerobic bottle

## 2024-05-22 NOTE — PROGRESS NOTE ADULT - SUBJECTIVE AND OBJECTIVE BOX
Patient is a 48y old  Male who presents with a chief complaint of weakness (22 May 2024 07:15)      INTERVAL HPI/OVERNIGHT EVENTS:    No acute overnight events. appetite improved, tenesmus improved  diarrhea amount/freq unchanged, 3 episodes in am alone  Patient denies chest pain, dyspnea,fever/chills    MEDICATIONS  (STANDING):  cefTRIAXone Injectable. 2000 milliGRAM(s) IV Push every 24 hours  lactated ringers. 1000 milliLiter(s) (100 mL/Hr) IV Continuous <Continuous>  metroNIDAZOLE    Tablet 500 milliGRAM(s) Oral three times a day    MEDICATIONS  (PRN):  acetaminophen     Tablet .. 650 milliGRAM(s) Oral every 6 hours PRN Temp greater or equal to 38C (100.4F), Mild Pain (1 - 3)  aluminum hydroxide/magnesium hydroxide/simethicone Suspension 30 milliLiter(s) Oral every 4 hours PRN Dyspepsia  melatonin 3 milliGRAM(s) Oral at bedtime PRN Insomnia  ondansetron Injectable 4 milliGRAM(s) IV Push every 8 hours PRN Nausea and/or Vomiting      Allergies    penicillin (Hives)    Intolerances        REVIEW OF SYSTEMS:  CONSTITUTIONAL: No fever, weight loss, or fatigue  RESPIRATORY: No cough, wheezing, chills or hemoptysis; No shortness of breath  CARDIOVASCULAR: No chest pain, palpitations, dizziness, or leg swelling  GASTROINTESTINAL: No abdominal or epigastric pain. No nausea, vomiting, or hematemesis; No diarrhea or constipation. No melena or hematochezia.  NEUROLOGICAL: No headaches, memory loss, loss of strength, numbness, or tremors  MUSCULOSKELETAL: No joint pain or swelling; No muscle, back, or extremity pain      Vital Signs Last 24 Hrs  T(C): 36.7 (22 May 2024 09:21), Max: 37.2 (21 May 2024 16:45)  T(F): 98.1 (22 May 2024 09:21), Max: 98.9 (21 May 2024 16:45)  HR: 50 (22 May 2024 09:21) (50 - 57)  BP: 111/63 (22 May 2024 09:21) (111/63 - 130/51)  BP(mean): 90 (21 May 2024 21:20) (90 - 90)  RR: 19 (22 May 2024 09:21) (18 - 19)  SpO2: 100% (22 May 2024 09:21) (96% - 100%)    Parameters below as of 22 May 2024 09:21  Patient On (Oxygen Delivery Method): room air        PHYSICAL EXAM:  GENERAL: NAD,   HEAD:  Atraumatic, Normocephalic  EYES: conjunctiva and sclera clear  NECK: Supple, No JVD  NERVOUS SYSTEM:  Alert & Oriented X3, No gross focal deficits  CHEST/LUNG: Clear to auscultation bilaterally; No rales, rhonchi, wheezing, or rubs  HEART: Regular rate and rhythm; No murmurs, rubs, or gallops  ABDOMEN: Soft, Nontender, Nondistended; Bowel sounds present  EXTREMITIES:  No edema    LABS:    05-21    138  |  98  |  20.6<H>  ----------------------------<  99  3.6   |  26.0  |  1.18    Ca    8.3<L>      21 May 2024 05:45  Phos  2.3     05-21  Mg     2.5     05-21    TPro  6.7  /  Alb  3.5  /  TBili  0.7  /  DBili  x   /  AST  38  /  ALT  55<H>  /  AlkPhos  65  05-20      Urinalysis Basic - ( 21 May 2024 05:45 )    Color: x / Appearance: x / SG: x / pH: x  Gluc: 99 mg/dL / Ketone: x  / Bili: x / Urobili: x   Blood: x / Protein: x / Nitrite: x   Leuk Esterase: x / RBC: x / WBC x   Sq Epi: x / Non Sq Epi: x / Bacteria: x      CAPILLARY BLOOD GLUCOSE          RADIOLOGY & ADDITIONAL TESTS:    Consultant(s) Notes Reviewed:  [ x] YES  [ ] NO    Care Discussed with Consultants/Other Providers [ x] YES  [ ] NO    Plan of Care discussed with Attending [ x]YES [ ] NO

## 2024-05-22 NOTE — PROGRESS NOTE ADULT - SUBJECTIVE AND OBJECTIVE BOX
Patient seen and examined    Continues with diarrhea - 5 x so far  less watery      REVIEW OF SYSTEMS:    CONSTITUTIONAL: No F/C  RESPIRATORY: No cough,  No SOB  CARDIOVASCULAR: No CP/palpitations,    GASTROINTESTINAL: No abdominal pain  or NV   GENITOURINARY: No UTI sx  NEUROLOGICAL: No headaches, NO wk/numbness  MUSCULOSKELETAL:   EXTREMITIES : no swelling,    Vital Signs Last 24 Hrs  T(C): 36.7 (22 May 2024 09:21), Max: 37.2 (21 May 2024 16:45)  T(F): 98.1 (22 May 2024 09:21), Max: 98.9 (21 May 2024 16:45)  HR: 50 (22 May 2024 09:21) (50 - 57)  BP: 111/63 (22 May 2024 09:21) (111/63 - 130/51)  BP(mean): 90 (21 May 2024 21:20) (90 - 90)  RR: 19 (22 May 2024 09:21) (18 - 19)  SpO2: 100% (22 May 2024 09:21) (96% - 100%)    Parameters below as of 22 May 2024 09:21  Patient On (Oxygen Delivery Method): room air        PHYSICAL EXAM:    GENERAL: NAD,   EYES:  conjunctiva and sclera clear  NECK: Supple, No JVD/Bruit  NERVOUS SYSTEM:  A/O x3,   CHEST:  No rales or rhonchi  HEART:  RRR, No murmur  ABDOMEN: Soft, NT/ND BS+  EXTREMITIES:  No Edema;  SKIN: No rashes    LABS:      05-21    138  |  98  |  20.6<H>  ----------------------------<  99  3.6   |  26.0  |  1.18    Ca    8.3<L>      21 May 2024 05:45  Phos  2.3     05-21  Mg     2.5     05-21        MEDICATIONS  (STANDING):  acetaminophen     Tablet .. PRN  aluminum hydroxide/magnesium hydroxide/simethicone Suspension PRN  cefTRIAXone Injectable.  heparin   Injectable  lactated ringers.  melatonin PRN  metroNIDAZOLE    Tablet  ondansetron Injectable PRN

## 2024-05-22 NOTE — PROGRESS NOTE ADULT - ASSESSMENT
JOSIE: vol depletion from GI losses (salmonella)---> slowly resolving - creat 1.18, no new labs  labs am    Influenza A+  - cont IVF  - supportive care  - trend labs

## 2024-05-22 NOTE — PATIENT PROFILE ADULT - FALL HARM RISK - UNIVERSAL INTERVENTIONS
Bed in lowest position, wheels locked, appropriate side rails in place/Call bell, personal items and telephone in reach/Instruct patient to call for assistance before getting out of bed or chair/Non-slip footwear when patient is out of bed/[place] to call system/Physically safe environment - no spills, clutter or unnecessary equipment/Purposeful Proactive Rounding/Room/bathroom lighting operational, light cord in reach

## 2024-05-22 NOTE — PROGRESS NOTE ADULT - ASSESSMENT
48 yr old male with WILFREDO on CPAP presents with complaints of weakness and diarrhea for 4 days. States he was visiting [place] for 7 days, returned on Thursday. He reports eating a local delicacy prior to his flight. He reports he felt sick and sore on his flight back, started to have vomiting and multiple episodes of watery stools with some runny nose. He went to urgent care on Friday and was told he has Influenza, was prescribed Tamiflu and Clindamycin, which he did not take. He continued to have poor oral intake, with ongoing vomiting and diarrhea since. He felt cramps in his LE today and felt dehydrated hence came in for evaluation. Felt chills, fever, took one Ibuprofen 800 mg yesterday. No headaches, abdominal pain. (19 May 2024 17:07)    Salmonella Bacteremia/ Gastroenteritis  Bandemia  Influenza A  Penicillin allergy-hives as a baby  s/p JOSIE    - persistent diarrhea and abdominal cramping. patient had "blood sausage" in [place] on 5/16 and symptoms started later that night. had drinks made with ice otherwise had bottled watter  - GI PCR + salmonella, epec EAEC  - f/u salmonella ID+S on stool culture  -  BCX + salmonella f/u ID+S  - repeat BCX x 2  - c/w ceftriaxone. -no reactions noted  - Tte  - Continue flagyl  - defer tamiflu-out of the window  - Trend Fever  - Trend WBC   - droplet iso for influenza per infection control    d/w Dr Power, pharmacy  Will Follow

## 2024-05-22 NOTE — PROGRESS NOTE ADULT - ASSESSMENT
48 yr old male with WILFREDO on CPAP presents with complaints of weakness and diarrhea for 4 days. admitted for flu +, acute renal failure. Labs reveal elevated Hb, creatinine 3.9 with HCO3 20. Elevated LFTs.     Diarrhea 2/2  Bacterial Gastroenteritis, recent travel to [place]  Still with diarrhea and poor appetite  GI PCR with salmonella, e coli- epec and eaec  - flagyl 500 tid and ceftriaxone 2g, tolerated amoxicillin before  - f/u salmonella speciation on culture  continue IVF  - blood cultures pending  - id consult    JOSIE with metabolic acidosis, resolved  pre-renal in the setting of bacterial gastroenteritis normalized s/p ivf. US without acute findings of hydronephrosis or mass.  Continue IVF given poor appetite  monitor renal function  nephrology consult    Influenza A  past time period for tamiflu     WILFREDO on CPAP:  CPAP ordered     DVT ppx:  SCDs   48 yr old male with WILFREDO on CPAP presents with complaints of weakness and diarrhea for 4 days. admitted for flu +, acute renal failure. Labs reveal elevated Hb, creatinine 3.9 with HCO3 20. Elevated LFTs.     Diarrhea 2/2  Bacterial Gastroenteritis, recent travel to [place]  Still with diarrhea frequent. Improved appetite/ tenesmus on abx  GI PCR with salmonella, e coli- epec and eaec  - flagyl 500 tid and ceftriaxone 2g, tolerated amoxicillin before, pending salmonella speciation/resistance  - f/u salmonella speciation on culture /resistance  continue IVF given diarrhea  - blood cultures pending  - id consult    JOSIE with metabolic acidosis, resolved  pre-renal in the setting of bacterial gastroenteritis normalized s/p ivf. US without acute findings of hydronephrosis or mass.  Continue IVF given poor appetite  monitor renal function  nephrology consult    Influenza A  past time period for tamiflu     WILFREDO on CPAP:  CPAP ordered     DVT ppx:  SCDs   48 yr old male with WILFREDO on CPAP presents with complaints of weakness and diarrhea for 4 days. admitted for flu +, acute renal failure. Labs reveal elevated Hb, creatinine 3.9 with HCO3 20. Elevated LFTs.     Diarrhea 2/2  Bacterial Gastroenteritis, recent travel to [place]  Still with diarrhea frequent. Improved appetite/ tenesmus on abx  GI PCR with salmonella, e coli- epec and eaec  - flagyl 500 tid and ceftriaxone 2g, tolerated amoxicillin before, pending salmonella speciation/resistance  - f/u salmonella speciation on culture /resistance  continue IVF given diarrhea  - blood cultures pending  - id consult    JOSIE with metabolic acidosis, resolved  pre-renal in the setting of bacterial gastroenteritis normalized s/p ivf. US without acute findings of hydronephrosis or mass.  Continue IVF given poor appetite  monitor renal function  nephrology consult    Influenza A  past time period for tamiflu     WILFREDO on CPAP:  chronic, uses cpap at home  - CPAP at night during hospital stay     DVT ppx:  SCDs

## 2024-05-22 NOTE — PATIENT PROFILE ADULT - FUNCTIONAL SCREEN CURRENT LEVEL: SWALLOWING (IF SCORE 2 OR MORE FOR ANY ITEM, CONSULT REHAB SERVICES), MLM)
0 = swallows foods/liquids without difficulty Mohs Histo Method Verbiage: Each section was then chromacoded and processed in the Mohs lab using the Mohs protocol and submitted for frozen section.

## 2024-05-22 NOTE — PROGRESS NOTE ADULT - SUBJECTIVE AND OBJECTIVE BOX
[facility]                                                INFECTIOUS DISEASES  =======================================================                   Anson Larry#   Avila Hunt MD#    Dillon Bashir MD*                           Jena Mehta MD*   Mirna Beckman MD*           Diplomates American Board of Internal Medicine & Infectious Diseases                  # [place] Office - Appt - Tel  [phone] Fax [phone]                * [place] Office - Appt - Tel [phone] Fax [phone]                                  Hospital Consult line:  [phone]  =======================================================      [facility]-[id number]  EDDIE AYANA   follow up for:salmonella  bcx + for salmonella  reports persistent loose diarrhea  abdominal pain better  able to eat, no nausea  patient seen and examined.       I have personally reviewed the labs and data; pertinent labs and data are listed in this note; please see below.   ===================================================  REVIEW OF SYSTEMS:  CONSTITUTIONAL:  No Fever or chills  HEENT:  No diplopia or blurred vision.  No earache, sore throat or runny nose.  CARDIOVASCULAR:  No pressure, squeezing, strangling, tightness, heaviness or aching about the chest, neck, axilla or epigastrium.  RESPIRATORY:  No cough, shortness of breath  GASTROINTESTINAL:  No nausea, vomiting +diarrhea.  GENITOURINARY:  No dysuria, frequency or urgency. No Blood in urine  MUSCULOSKELETAL:  no joint aches, no muscle pain  SKIN:  No change in skin, hair or nails.  NEUROLOGIC:  No Headaches, seizures or weakness.  PSYCHIATRIC:  No disorder of thought or mood.  ENDOCRINE:  No heat or cold intolerance  HEMATOLOGICAL:  No easy bruising or bleeding.    =======================================================  Allergies    penicillin (Hives)    Intolerances    Antibiotics:  cefTRIAXone Injectable. 2000 milliGRAM(s) IV Push every 24 hours  metroNIDAZOLE    Tablet 500 milliGRAM(s) Oral three times a day    Other medications:  heparin   Injectable 5000 Unit(s) SubCutaneous every 8 hours  lactated ringers. 1000 milliLiter(s) IV Continuous <Continuous>    ======================================================  Physical Exam:  ============  T(F): 98.4 (22 May 2024 20:29), Max: 98.4 (22 May 2024 20:29)  HR: 60 (22 May 2024 20:29)  BP: 121/64 (22 May 2024 20:29)  RR: 18 (22 May 2024 20:29)  SpO2: 96% (22 May 2024 20:29) (96% - 100%)  temp max in last 48H T(F): , Max: 98.9 (05-21-24 @ 16:45)    General:  No acute distress.  Eye: no conjunctival pallor, no scleral icterus    Respiratory: Lungs are clear to auscultation, Respirations are non-labored.  Cardiovascular: Normal rate, Regular rhythm,  s1+s2  Gastrointestinal: Soft, Non-tender, Non-distended, Normal bowel sounds.  Genitourinary: No costovertebral angle tenderness.    Neurologic: Alert, Oriented, No focal deficits  Psychiatric: Appropriate mood & affect.  =======================================================  Labs:    05-21    138  |  98  |  20.6<H>  ----------------------------<  99  3.6   |  26.0  |  1.18    Ca    8.3<L>      21 May 2024 05:45  Phos  2.3     05-21  Mg     2.5     05-21        Culture - Blood (collected 05-21-24 @ 15:00)  Source: .Blood Blood-Peripheral  Gram Stain (05-22-24 @ 10:34):    Growth in aerobic bottle: Gram Negative Rods  Preliminary Report (05-22-24 @ 10:36):    Growth in aerobic bottle: Gram Negative Rods    Direct identification is available within approximately 3-5    hours either by Blood Panel Multiplexed PCR or Direct    MALDI-TOF. Details: [email or web address]  Organism: Blood Culture PCR (05-22-24 @ 12:27)  Organism: Blood Culture PCR (05-22-24 @ 12:27)    Sensitivities:      Method Type: PCR      -  Salmonella species: Detec    Culture - Blood (collected 05-21-24 @ 14:51)  Source: .Blood Blood-Peripheral  Gram Stain (05-22-24 @ 16:03):    Growth in aerobic bottle: Gram Negative Rods  Preliminary Report (05-22-24 @ 16:03):    Growth in aerobic bottle: Gram Negative Rods    Culture - Reflex Stool (collected 05-20-24 @ 12:12)  Source: .Stool  Final Report (05-22-24 @ 14:47):    Salmonella species, not typhi/paratyphi isolated    Culture - Stool (collected 05-20-24 @ 12:12)  Source: .Stool Feces  Final Report (05-22-24 @ 21:02):    Moderate Aeromonas hydrophila/caviae complex "Susceptibilities not    performed"    (Stool culture examined for Salmonella,    Shigella, Campylobacter, Aeromonas, Plesiomonas,    Vibrio, E.coli O157 and Yersinia)

## 2024-05-23 ENCOUNTER — RESULT REVIEW (OUTPATIENT)
Age: 49
End: 2024-05-23

## 2024-05-23 LAB
ALBUMIN SERPL ELPH-MCNC: 3.3 G/DL — SIGNIFICANT CHANGE UP (ref 3.3–5.2)
ALP SERPL-CCNC: 64 U/L — SIGNIFICANT CHANGE UP (ref 40–120)
ALT FLD-CCNC: 70 U/L — HIGH
ANION GAP SERPL CALC-SCNC: 8 MMOL/L — SIGNIFICANT CHANGE UP (ref 5–17)
AST SERPL-CCNC: 45 U/L — HIGH
BASOPHILS # BLD AUTO: 0 K/UL — SIGNIFICANT CHANGE UP (ref 0–0.2)
BASOPHILS NFR BLD AUTO: 0 % — SIGNIFICANT CHANGE UP (ref 0–2)
BILIRUB SERPL-MCNC: 0.5 MG/DL — SIGNIFICANT CHANGE UP (ref 0.4–2)
BUN SERPL-MCNC: 14.1 MG/DL — SIGNIFICANT CHANGE UP (ref 8–20)
CALCIUM SERPL-MCNC: 8.6 MG/DL — SIGNIFICANT CHANGE UP (ref 8.4–10.5)
CHLORIDE SERPL-SCNC: 102 MMOL/L — SIGNIFICANT CHANGE UP (ref 96–108)
CO2 SERPL-SCNC: 29 MMOL/L — SIGNIFICANT CHANGE UP (ref 22–29)
CREAT SERPL-MCNC: 1.17 MG/DL — SIGNIFICANT CHANGE UP (ref 0.5–1.3)
EGFR: 77 ML/MIN/1.73M2 — SIGNIFICANT CHANGE UP
EOSINOPHIL # BLD AUTO: 0.07 K/UL — SIGNIFICANT CHANGE UP (ref 0–0.5)
EOSINOPHIL NFR BLD AUTO: 0.8 % — SIGNIFICANT CHANGE UP (ref 0–6)
GLUCOSE SERPL-MCNC: 102 MG/DL — HIGH (ref 70–99)
HCT VFR BLD CALC: 41.3 % — SIGNIFICANT CHANGE UP (ref 39–50)
HGB BLD-MCNC: 13.8 G/DL — SIGNIFICANT CHANGE UP (ref 13–17)
LYMPHOCYTES # BLD AUTO: 1.93 K/UL — SIGNIFICANT CHANGE UP (ref 1–3.3)
LYMPHOCYTES # BLD AUTO: 23.5 % — SIGNIFICANT CHANGE UP (ref 13–44)
MAGNESIUM SERPL-MCNC: 2 MG/DL — SIGNIFICANT CHANGE UP (ref 1.6–2.6)
MANUAL SMEAR VERIFICATION: SIGNIFICANT CHANGE UP
MCHC RBC-ENTMCNC: 27.9 PG — SIGNIFICANT CHANGE UP (ref 27–34)
MCHC RBC-ENTMCNC: 33.4 GM/DL — SIGNIFICANT CHANGE UP (ref 32–36)
MCV RBC AUTO: 83.6 FL — SIGNIFICANT CHANGE UP (ref 80–100)
METAMYELOCYTES # FLD: 0.9 % — HIGH (ref 0–0)
MONOCYTES # BLD AUTO: 0.85 K/UL — SIGNIFICANT CHANGE UP (ref 0–0.9)
MONOCYTES NFR BLD AUTO: 10.4 % — SIGNIFICANT CHANGE UP (ref 2–14)
NEUTROPHILS # BLD AUTO: 4.57 K/UL — SIGNIFICANT CHANGE UP (ref 1.8–7.4)
NEUTROPHILS NFR BLD AUTO: 54.8 % — SIGNIFICANT CHANGE UP (ref 43–77)
NEUTS BAND # BLD: 0.9 % — SIGNIFICANT CHANGE UP (ref 0–8)
NRBC # BLD: 1 /100 WBCS — HIGH (ref 0–0)
PLAT MORPH BLD: NORMAL — SIGNIFICANT CHANGE UP
PLATELET # BLD AUTO: 200 K/UL — SIGNIFICANT CHANGE UP (ref 150–400)
POTASSIUM SERPL-MCNC: 3.7 MMOL/L — SIGNIFICANT CHANGE UP (ref 3.5–5.3)
POTASSIUM SERPL-SCNC: 3.7 MMOL/L — SIGNIFICANT CHANGE UP (ref 3.5–5.3)
PROMYELOCYTES # FLD: 0.9 % — HIGH (ref 0–0)
PROT SERPL-MCNC: 6.1 G/DL — LOW (ref 6.6–8.7)
RBC # BLD: 4.94 M/UL — SIGNIFICANT CHANGE UP (ref 4.2–5.8)
RBC # FLD: 12.8 % — SIGNIFICANT CHANGE UP (ref 10.3–14.5)
RBC BLD AUTO: NORMAL — SIGNIFICANT CHANGE UP
SMUDGE CELLS # BLD: PRESENT — SIGNIFICANT CHANGE UP
SODIUM SERPL-SCNC: 139 MMOL/L — SIGNIFICANT CHANGE UP (ref 135–145)
VARIANT LYMPHS # BLD: 7.8 % — HIGH (ref 0–6)
WBC # BLD: 8.21 K/UL — SIGNIFICANT CHANGE UP (ref 3.8–10.5)
WBC # FLD AUTO: 8.21 K/UL — SIGNIFICANT CHANGE UP (ref 3.8–10.5)

## 2024-05-23 PROCEDURE — 93306 TTE W/DOPPLER COMPLETE: CPT | Mod: 26

## 2024-05-23 PROCEDURE — 99232 SBSQ HOSP IP/OBS MODERATE 35: CPT

## 2024-05-23 PROCEDURE — 99233 SBSQ HOSP IP/OBS HIGH 50: CPT | Mod: GC

## 2024-05-23 PROCEDURE — 93356 MYOCRD STRAIN IMG SPCKL TRCK: CPT

## 2024-05-23 RX ADMIN — ONDANSETRON 4 MILLIGRAM(S): 8 TABLET, FILM COATED ORAL at 21:26

## 2024-05-23 RX ADMIN — Medication 500 MILLIGRAM(S): at 15:38

## 2024-05-23 RX ADMIN — Medication 3 MILLIGRAM(S): at 21:26

## 2024-05-23 RX ADMIN — Medication 500 MILLIGRAM(S): at 21:26

## 2024-05-23 RX ADMIN — SODIUM CHLORIDE 100 MILLILITER(S): 9 INJECTION, SOLUTION INTRAVENOUS at 21:26

## 2024-05-23 RX ADMIN — SODIUM CHLORIDE 100 MILLILITER(S): 9 INJECTION, SOLUTION INTRAVENOUS at 10:35

## 2024-05-23 RX ADMIN — ONDANSETRON 4 MILLIGRAM(S): 8 TABLET, FILM COATED ORAL at 00:06

## 2024-05-23 RX ADMIN — CEFTRIAXONE 2000 MILLIGRAM(S): 500 INJECTION, POWDER, FOR SOLUTION INTRAMUSCULAR; INTRAVENOUS at 15:38

## 2024-05-23 RX ADMIN — Medication 500 MILLIGRAM(S): at 05:20

## 2024-05-23 NOTE — PROGRESS NOTE ADULT - ASSESSMENT
48 yr old male with WILFREDO on CPAP presents with complaints of weakness and diarrhea for 4 days. admitted for flu +, acute renal failure. Labs reveal elevated Hb, creatinine 3.9 with HCO3 20. Elevated LFTs.     Diarrhea 2/2  Bacterial Gastroenteritis, recent travel to [place]  Still with diarrhea frequent. Improved appetite/ tenesmus on abx  GI PCR with salmonella - non-typhoid, e coli- epec and eaec  initial blood cultures with g- sarah, f/u resistance  - flagyl 500 tid and ceftriaxone 2g, tolerated amoxicillin before, pending salmonella speciation/resistance  - f/u salmonella speciation on culture /resistance  continue IVF given diarrhea  - repeat blood cultures pending  - id consult    JOSIE with metabolic acidosis, resolved  pre-renal in the setting of bacterial gastroenteritis normalized s/p ivf. US without acute findings of hydronephrosis or mass.  Continue IVF given poor appetite  monitor renal function  nephrology consult    Influenza A  past time period for tamiflu     WILFREDO on CPAP:  chronic, uses cpap at home  - CPAP at night during hospital stay     DVT ppx:  SCDs   48 yr old male with WILFREDO on CPAP presents with complaints of weakness and diarrhea for 4 days. admitted for flu +, acute renal failure. Labs reveal elevated Hb, creatinine 3.9 with HCO3 20. Elevated LFTs.     Diarrhea 2/2  Bacterial Gastroenteritis, recent travel to [place]  Still with diarrhea frequent. Improved appetite/ tenesmus on abx  GI PCR with salmonella - non-typhoid, e coli- epec and eaec  initial blood cultures with g- sarah, f/u resistance  - flagyl 500 tid and ceftriaxone 2g, tolerated amoxicillin before, pending salmonella speciation/resistance  - f/u salmonella speciation on culture /resistance  continue IVF given diarrhea  - repeat blood cultures pending  - id consult  - echo pending  - 1 week after completion of antibiotics, repeat stool culture and blood culture  - outpatient ID follow up    JOSIE with metabolic acidosis, resolved  pre-renal in the setting of bacterial gastroenteritis normalized s/p ivf. US without acute findings of hydronephrosis or mass.  Continue IVF given poor appetite  monitor renal function  nephrology consult    Influenza A  past time period for tamiflu     WILFREDO on CPAP:  chronic, uses cpap at home  - CPAP at night during hospital stay     DVT ppx:  SCDs

## 2024-05-23 NOTE — PROGRESS NOTE ADULT - SUBJECTIVE AND OBJECTIVE BOX
Patient is a 48y old  Male who presents with a chief complaint of weakness (22 May 2024 18:42)      INTERVAL HPI/OVERNIGHT EVENTS:    No acute overnight events. appetite improved, tenesmus improved  diarrhea amount/freq unchanged, 3 episodes in am alone  Patient denies chest pain, dyspnea,fever/chills    MEDICATIONS  (STANDING):  cefTRIAXone Injectable. 2000 milliGRAM(s) IV Push every 24 hours  heparin   Injectable 5000 Unit(s) SubCutaneous every 8 hours  lactated ringers. 1000 milliLiter(s) (100 mL/Hr) IV Continuous <Continuous>  metroNIDAZOLE    Tablet 500 milliGRAM(s) Oral three times a day    MEDICATIONS  (PRN):  acetaminophen     Tablet .. 650 milliGRAM(s) Oral every 6 hours PRN Temp greater or equal to 38C (100.4F), Mild Pain (1 - 3)  aluminum hydroxide/magnesium hydroxide/simethicone Suspension 30 milliLiter(s) Oral every 4 hours PRN Dyspepsia  melatonin 3 milliGRAM(s) Oral at bedtime PRN Insomnia  ondansetron Injectable 4 milliGRAM(s) IV Push every 8 hours PRN Nausea and/or Vomiting      Allergies    penicillin (Hives)    Intolerances        REVIEW OF SYSTEMS:  CONSTITUTIONAL: No fever, weight loss, or fatigue  RESPIRATORY: No cough, wheezing, chills or hemoptysis; No shortness of breath  CARDIOVASCULAR: No chest pain, palpitations, dizziness, or leg swelling  GASTROINTESTINAL: No abdominal or epigastric pain. No nausea, vomiting, or hematemesis; +diarrhea. No melena or hematochezia.  NEUROLOGICAL: No headaches, memory loss, loss of strength, numbness, or tremors  MUSCULOSKELETAL: No joint pain or swelling; No muscle, back, or extremity pain      Vital Signs Last 24 Hrs  T(C): 36.9 (23 May 2024 09:13), Max: 36.9 (22 May 2024 20:29)  T(F): 98.4 (23 May 2024 09:13), Max: 98.4 (22 May 2024 20:29)  HR: 53 (23 May 2024 09:13) (53 - 61)  BP: 120/70 (23 May 2024 09:13) (118/68 - 134/69)  BP(mean): --  RR: 18 (23 May 2024 09:13) (18 - 18)  SpO2: 98% (23 May 2024 09:13) (96% - 98%)    Parameters below as of 23 May 2024 09:13  Patient On (Oxygen Delivery Method): room air        PHYSICAL EXAM:  GENERAL: NAD,   HEAD:  Atraumatic, Normocephalic  EYES: conjunctiva and sclera clear  NECK: Supple, No JVD  NERVOUS SYSTEM:  Alert & Oriented X3, No gross focal deficits  CHEST/LUNG: Clear to auscultation bilaterally; No rales, rhonchi, wheezing, or rubs  HEART: Regular rate and rhythm; No murmurs, rubs, or gallops  ABDOMEN: Soft, Nontender, Nondistended; Bowel sounds present  EXTREMITIES:  No edema    LABS:                        13.8   8.21  )-----------( 200      ( 23 May 2024 08:56 )             41.3     05-23    139  |  102  |  14.1  ----------------------------<  102<H>  3.7   |  29.0  |  1.17    Ca    8.6      23 May 2024 08:56  Mg     2.0     05-23    TPro  6.1<L>  /  Alb  3.3  /  TBili  0.5  /  DBili  x   /  AST  45<H>  /  ALT  70<H>  /  AlkPhos  64  05-23      Urinalysis Basic - ( 23 May 2024 08:56 )    Color: x / Appearance: x / SG: x / pH: x  Gluc: 102 mg/dL / Ketone: x  / Bili: x / Urobili: x   Blood: x / Protein: x / Nitrite: x   Leuk Esterase: x / RBC: x / WBC x   Sq Epi: x / Non Sq Epi: x / Bacteria: x      CAPILLARY BLOOD GLUCOSE          RADIOLOGY & ADDITIONAL TESTS:    Consultant(s) Notes Reviewed:  [ x] YES  [ ] NO    Care Discussed with Consultants/Other Providers [ x] YES  [ ] NO    Plan of Care discussed with Attending [ x]YES [ ] NO

## 2024-05-23 NOTE — PROGRESS NOTE ADULT - ASSESSMENT
JOSIE: vol depletion from GI losses (salmonella)---> slowly resolving - creat 1.18>1.17     Influenza A+ Sx resolved largely  - cont IVF  - supportive care  - trend labs

## 2024-05-23 NOTE — PROGRESS NOTE ADULT - SUBJECTIVE AND OBJECTIVE BOX
[facility]                                                INFECTIOUS DISEASES  =======================================================                   Anson Larry#   Avila Hunt MD#    Dillon Bashir MD*                           Jena Mehta MD*   Mirna Beckman MD*           Diplomates American Board of Internal Medicine & Infectious Diseases                  # [place] Office - Appt - Tel  [phone] Fax [phone]                * [place] Office - Appt - Tel [phone] Fax [phone]                                  Hospital Consult line:  [phone]  =======================================================      [facility]-[id number]  EDDIE PIÑA   follow up for:salmonella  feels better  4-5 loose stool today but better  eating and states tolerating food but still with diarrhea-peanut M&M's noted at bedside  patient seen and examined.       I have personally reviewed the labs and data; pertinent labs and data are listed in this note; please see below.   ===================================================  REVIEW OF SYSTEMS:  CONSTITUTIONAL:  No Fever or chills  HEENT:  No diplopia or blurred vision.  No earache, sore throat or runny nose.  CARDIOVASCULAR:  No pressure, squeezing, strangling, tightness, heaviness or aching about the chest, neck, axilla or epigastrium.  RESPIRATORY:  No cough, shortness of breath  GASTROINTESTINAL:  No nausea, vomiting +diarrhea.  GENITOURINARY:  No dysuria, frequency or urgency. No Blood in urine  MUSCULOSKELETAL:  no joint aches, no muscle pain  SKIN:  No change in skin, hair or nails.  NEUROLOGIC:  No Headaches, seizures or weakness.  PSYCHIATRIC:  No disorder of thought or mood.  ENDOCRINE:  No heat or cold intolerance  HEMATOLOGICAL:  No easy bruising or bleeding.    =======================================================  Allergies    penicillin (Hives)    Intolerances    Antibiotics:  cefTRIAXone Injectable. 2000 milliGRAM(s) IV Push every 24 hours  metroNIDAZOLE    Tablet 500 milliGRAM(s) Oral three times a day    Other medications:  heparin   Injectable 5000 Unit(s) SubCutaneous every 8 hours  lactated ringers. 1000 milliLiter(s) IV Continuous <Continuous>    ======================================================  Physical Exam:  ============  Vital Signs Last 24 Hrs  T(C): 36.6 (23 May 2024 20:55), Max: 37.2 (23 May 2024 16:53)  T(F): 97.8 (23 May 2024 20:55), Max: 98.9 (23 May 2024 16:53)  HR: 56 (23 May 2024 20:55) (53 - 62)  BP: 166/79 (23 May 2024 20:55) (120/70 - 166/79)  BP(mean): --  RR: 18 (23 May 2024 20:55) (18 - 18)  SpO2: 98% (23 May 2024 20:55) (96% - 98%)    Parameters below as of 23 May 2024 20:55  Patient On (Oxygen Delivery Method): room air        General:  No acute distress.  Eye: no conjunctival pallor, no scleral icterus    Respiratory: Lungs are clear to auscultation, Respirations are non-labored.  Cardiovascular: Normal rate, Regular rhythm,  s1+s2  Gastrointestinal: Soft, Non-tender, Non-distended, Normal bowel sounds.  Genitourinary: No costovertebral angle tenderness.    Neurologic: Alert, Oriented, No focal deficits  Psychiatric: Appropriate mood & affect.  =======================================================  Labs:                          13.8   8.21  )-----------( 200      ( 23 May 2024 08:56 )             41.3       05-23    139  |  102  |  14.1  ----------------------------<  102<H>  3.7   |  29.0  |  1.17    Ca    8.6      23 May 2024 08:56  Mg     2.0     05-23    TPro  6.1<L>  /  Alb  3.3  /  TBili  0.5  /  DBili  x   /  AST  45<H>  /  ALT  70<H>  /  AlkPhos  64  05-23              Urinalysis Basic - ( 23 May 2024 08:56 )    Color: x / Appearance: x / SG: x / pH: x  Gluc: 102 mg/dL / Ketone: x  / Bili: x / Urobili: x   Blood: x / Protein: x / Nitrite: x   Leuk Esterase: x / RBC: x / WBC x   Sq Epi: x / Non Sq Epi: x / Bacteria: x                  CAPILLARY BLOOD GLUCOSE                    Culture - Blood (collected 05-21-24 @ 15:00)  Source: .Blood Blood-Peripheral  Gram Stain (05-22-24 @ 10:34):    Growth in aerobic bottle: Gram Negative Rods  Preliminary Report (05-22-24 @ 10:36):    Growth in aerobic bottle: Gram Negative Rods    Direct identification is available within approximately 3-5    hours either by Blood Panel Multiplexed PCR or Direct    MALDI-TOF. Details: [email or web address]  Organism: Blood Culture PCR (05-22-24 @ 12:27)  Organism: Blood Culture PCR (05-22-24 @ 12:27)    Sensitivities:      Method Type: PCR      -  Salmonella species: Detec    Culture - Blood (collected 05-21-24 @ 14:51)  Source: .Blood Blood-Peripheral  Gram Stain (05-22-24 @ 16:03):    Growth in aerobic bottle: Gram Negative Rods  Preliminary Report (05-22-24 @ 16:03):    Growth in aerobic bottle: Gram Negative Rods    Culture - Reflex Stool (collected 05-20-24 @ 12:12)  Source: .Stool  Final Report (05-22-24 @ 14:47):    Salmonella species, not typhi/paratyphi isolated    Culture - Stool (collected 05-20-24 @ 12:12)  Source: .Stool Feces  Final Report (05-22-24 @ 21:02):    Moderate Aeromonas hydrophila/caviae complex "Susceptibilities not    performed"    (Stool culture examined for Salmonella,    Shigella, Campylobacter, Aeromonas, Plesiomonas,    Vibrio, E.coli O157 and Yersinia)

## 2024-05-23 NOTE — PROGRESS NOTE ADULT - ASSESSMENT
48 yr old male with WILFREDO on CPAP presents with complaints of weakness and diarrhea for 4 days. States [place] was visiting [place], returned on Thursday. He reports eating a local delicacy prior to his flight. He reports he felt sick and sore on his flight back, started to have vomiting and multiple episodes of watery stools with some runny nose. He went to urgent care on Friday and was told he has Influenza, was prescribed Tamiflu and Clindamycin, which he did not take. He continued to have poor oral intake, with ongoing vomiting and diarrhea since. He felt cramps in his LE today and felt dehydrated hence came in for evaluation. Felt chills, fever, took one Ibuprofen 800 mg yesterday. No headaches, abdominal pain. (19 May 2024 17:07)    Salmonella Bacteremia/ Gastroenteritis  Bandemia  Influenza A  Penicillin allergy-hives as a baby  s/p JOSIE  - improving  -  patient had "blood sausage" in [place] on 5[place]16 and symptoms started later that night. had drinks made with ice otherwise had bottled watter  - GI PCR + salmonella, epec EAEC  - f/u stool cx, salmonella species not typhi/paratyphi  -  BCX + salmonella f/u ID+S  - repeat BCX x 2 sent 5/23  - c/w ceftriaxone. -no reactions noted  - Tte wnl  - Continue flagyl  - defer tamiflu-out of the window  - Trend Fever  - Trend WBC   - droplet iso for influenza per infection control    d/w Dr Power, pharmacy, resident  Will Follow

## 2024-05-23 NOTE — PROGRESS NOTE ADULT - SUBJECTIVE AND OBJECTIVE BOX
Patient seen and examined    Continues with diarrhea - much improved -only 1 so far  less watery  No URI Sx      REVIEW OF SYSTEMS:    CONSTITUTIONAL: No F/C  RESPIRATORY: No cough,  No SOB  CARDIOVASCULAR: No CP/palpitations,    GASTROINTESTINAL: No abdominal pain  or NV   GENITOURINARY: No UTI sx  NEUROLOGICAL: No headaches, NO wk/numbness  MUSCULOSKELETAL:   EXTREMITIES : no swelling,    Vital Signs Last 24 Hrs  T(C): 36.9 (23 May 2024 09:13), Max: 36.9 (22 May 2024 20:29)  T(F): 98.4 (23 May 2024 09:13), Max: 98.4 (22 May 2024 20:29)  HR: 53 (23 May 2024 09:13) (53 - 61)  BP: 120/70 (23 May 2024 09:13) (118/68 - 134/69)  BP(mean): --  RR: 18 (23 May 2024 09:13) (18 - 18)  SpO2: 98% (23 May 2024 09:13) (96% - 98%)    Parameters below as of 23 May 2024 09:13  Patient On (Oxygen Delivery Method): room air    PHYSICAL EXAM:    GENERAL: NAD,   EYES:  conjunctiva and sclera clear  NECK: Supple, No JVD/Bruit  NERVOUS SYSTEM:  A/O x3,   CHEST:  No rales or rhonchi  HEART:  RRR, No murmur  ABDOMEN: Soft, NT/ND BS+  EXTREMITIES:  No Edema;  SKIN: No rashes    LABS:      05-21    138  |  98  |  20.6<H>  ----------------------------<  99  3.6   |  26.0  |  1.18    Ca    8.3<L>      21 May 2024 05:45  Phos  2.3     05-21  Mg     2.5     05-21 05-23    139  |  102  |  14.1  ----------------------------<  102<H>  3.7   |  29.0  |  1.17    Ca    8.6      23 May 2024 08:56  Mg     2.0     05-23    TPro  6.1<L>  /  Alb  3.3  /  TBili  0.5  /  DBili  x   /  AST  45<H>  /  ALT  70<H>  /  AlkPhos  64  05-23        MEDICATIONS  (STANDING):  acetaminophen     Tablet .. PRN  aluminum hydroxide/magnesium hydroxide/simethicone Suspension PRN  cefTRIAXone Injectable.  heparin   Injectable  lactated ringers.  melatonin PRN  metroNIDAZOLE    Tablet  ondansetron Injectable PRN

## 2024-05-24 LAB
-  AMPICILLIN: SIGNIFICANT CHANGE UP
-  CEFTRIAXONE: SIGNIFICANT CHANGE UP
-  CIPROFLOXACIN: SIGNIFICANT CHANGE UP
-  TRIMETHOPRIM/SULFAMETHOXAZOLE: SIGNIFICANT CHANGE UP
ALBUMIN SERPL ELPH-MCNC: 3.1 G/DL — LOW (ref 3.3–5.2)
ALP SERPL-CCNC: 64 U/L — SIGNIFICANT CHANGE UP (ref 40–120)
ALT FLD-CCNC: 68 U/L — HIGH
ANION GAP SERPL CALC-SCNC: 8 MMOL/L — SIGNIFICANT CHANGE UP (ref 5–17)
AST SERPL-CCNC: 46 U/L — HIGH
BASOPHILS # BLD AUTO: 0.1 K/UL — SIGNIFICANT CHANGE UP (ref 0–0.2)
BASOPHILS NFR BLD AUTO: 1 % — SIGNIFICANT CHANGE UP (ref 0–2)
BILIRUB SERPL-MCNC: 0.5 MG/DL — SIGNIFICANT CHANGE UP (ref 0.4–2)
BUN SERPL-MCNC: 13.2 MG/DL — SIGNIFICANT CHANGE UP (ref 8–20)
CALCIUM SERPL-MCNC: 8.2 MG/DL — LOW (ref 8.4–10.5)
CHLORIDE SERPL-SCNC: 105 MMOL/L — SIGNIFICANT CHANGE UP (ref 96–108)
CO2 SERPL-SCNC: 26 MMOL/L — SIGNIFICANT CHANGE UP (ref 22–29)
CREAT SERPL-MCNC: 1.05 MG/DL — SIGNIFICANT CHANGE UP (ref 0.5–1.3)
CULTURE RESULTS: ABNORMAL
CULTURE RESULTS: ABNORMAL
EGFR: 88 ML/MIN/1.73M2 — SIGNIFICANT CHANGE UP
EOSINOPHIL # BLD AUTO: 0.12 K/UL — SIGNIFICANT CHANGE UP (ref 0–0.5)
EOSINOPHIL NFR BLD AUTO: 1.2 % — SIGNIFICANT CHANGE UP (ref 0–6)
GLUCOSE SERPL-MCNC: 93 MG/DL — SIGNIFICANT CHANGE UP (ref 70–99)
HCT VFR BLD CALC: 40.9 % — SIGNIFICANT CHANGE UP (ref 39–50)
HGB BLD-MCNC: 14.2 G/DL — SIGNIFICANT CHANGE UP (ref 13–17)
IMM GRANULOCYTES NFR BLD AUTO: 5 % — HIGH (ref 0–0.9)
LYMPHOCYTES # BLD AUTO: 2.36 K/UL — SIGNIFICANT CHANGE UP (ref 1–3.3)
LYMPHOCYTES # BLD AUTO: 23.4 % — SIGNIFICANT CHANGE UP (ref 13–44)
MCHC RBC-ENTMCNC: 28.6 PG — SIGNIFICANT CHANGE UP (ref 27–34)
MCHC RBC-ENTMCNC: 34.7 GM/DL — SIGNIFICANT CHANGE UP (ref 32–36)
MCV RBC AUTO: 82.5 FL — SIGNIFICANT CHANGE UP (ref 80–100)
METHOD TYPE: SIGNIFICANT CHANGE UP
METHOD TYPE: SIGNIFICANT CHANGE UP
MONOCYTES # BLD AUTO: 0.9 K/UL — SIGNIFICANT CHANGE UP (ref 0–0.9)
MONOCYTES NFR BLD AUTO: 8.9 % — SIGNIFICANT CHANGE UP (ref 2–14)
NEUTROPHILS # BLD AUTO: 6.11 K/UL — SIGNIFICANT CHANGE UP (ref 1.8–7.4)
NEUTROPHILS NFR BLD AUTO: 60.5 % — SIGNIFICANT CHANGE UP (ref 43–77)
ORGANISM # SPEC MICROSCOPIC CNT: SIGNIFICANT CHANGE UP
PLATELET # BLD AUTO: 213 K/UL — SIGNIFICANT CHANGE UP (ref 150–400)
POTASSIUM SERPL-MCNC: 3.6 MMOL/L — SIGNIFICANT CHANGE UP (ref 3.5–5.3)
POTASSIUM SERPL-SCNC: 3.6 MMOL/L — SIGNIFICANT CHANGE UP (ref 3.5–5.3)
PROT SERPL-MCNC: 6.1 G/DL — LOW (ref 6.6–8.7)
RBC # BLD: 4.96 M/UL — SIGNIFICANT CHANGE UP (ref 4.2–5.8)
RBC # FLD: 12.6 % — SIGNIFICANT CHANGE UP (ref 10.3–14.5)
SODIUM SERPL-SCNC: 139 MMOL/L — SIGNIFICANT CHANGE UP (ref 135–145)
SPECIMEN SOURCE: SIGNIFICANT CHANGE UP
SPECIMEN SOURCE: SIGNIFICANT CHANGE UP
WBC # BLD: 10.09 K/UL — SIGNIFICANT CHANGE UP (ref 3.8–10.5)
WBC # FLD AUTO: 10.09 K/UL — SIGNIFICANT CHANGE UP (ref 3.8–10.5)

## 2024-05-24 PROCEDURE — 99233 SBSQ HOSP IP/OBS HIGH 50: CPT | Mod: GC

## 2024-05-24 PROCEDURE — 99232 SBSQ HOSP IP/OBS MODERATE 35: CPT

## 2024-05-24 RX ADMIN — ONDANSETRON 4 MILLIGRAM(S): 8 TABLET, FILM COATED ORAL at 21:33

## 2024-05-24 RX ADMIN — HEPARIN SODIUM 5000 UNIT(S): 5000 INJECTION INTRAVENOUS; SUBCUTANEOUS at 13:12

## 2024-05-24 RX ADMIN — ONDANSETRON 4 MILLIGRAM(S): 8 TABLET, FILM COATED ORAL at 13:12

## 2024-05-24 RX ADMIN — Medication 500 MILLIGRAM(S): at 21:33

## 2024-05-24 RX ADMIN — Medication 3 MILLIGRAM(S): at 21:33

## 2024-05-24 RX ADMIN — Medication 500 MILLIGRAM(S): at 05:09

## 2024-05-24 RX ADMIN — Medication 500 MILLIGRAM(S): at 13:11

## 2024-05-24 RX ADMIN — CEFTRIAXONE 2000 MILLIGRAM(S): 500 INJECTION, POWDER, FOR SOLUTION INTRAMUSCULAR; INTRAVENOUS at 15:37

## 2024-05-24 NOTE — PROGRESS NOTE ADULT - ASSESSMENT
48 yr old male with WILFREDO on CPAP presents with complaints of weakness and diarrhea for 4 days. admitted for flu +, acute renal failure.    JOSIE: vol depletion from GI losses (salmonella)---> slowly resolving - Cr 1.0 today    Influenza A+ Sx resolved largely  - cont IVF  - supportive care    Monitor labs will follow PRN heavy sensation in the chest

## 2024-05-24 NOTE — PROGRESS NOTE ADULT - SUBJECTIVE AND OBJECTIVE BOX
Patient is a 48y old  Male who presents with a chief complaint of weakness (23 May 2024 18:31)      INTERVAL HPI/OVERNIGHT EVENTS:    No acute overnight events. 3 episode diarrhea the last day. lower abdominal discomfort present but greatly improved.   + lower back pain , positional.  Patient denies chest pain, dyspnea, abdominal pain, fever/chills    MEDICATIONS  (STANDING):  cefTRIAXone Injectable. 2000 milliGRAM(s) IV Push every 24 hours  heparin   Injectable 5000 Unit(s) SubCutaneous every 8 hours  lactated ringers. 1000 milliLiter(s) (100 mL/Hr) IV Continuous <Continuous>  metroNIDAZOLE    Tablet 500 milliGRAM(s) Oral three times a day    MEDICATIONS  (PRN):  acetaminophen     Tablet .. 650 milliGRAM(s) Oral every 6 hours PRN Temp greater or equal to 38C (100.4F), Mild Pain (1 - 3)  aluminum hydroxide/magnesium hydroxide/simethicone Suspension 30 milliLiter(s) Oral every 4 hours PRN Dyspepsia  melatonin 3 milliGRAM(s) Oral at bedtime PRN Insomnia  ondansetron Injectable 4 milliGRAM(s) IV Push every 8 hours PRN Nausea and/or Vomiting      Allergies    penicillin (Hives)    Intolerances        REVIEW OF SYSTEMS:  CONSTITUTIONAL: No fever, weight loss, or fatigue  RESPIRATORY: No cough, wheezing, chills or hemoptysis; No shortness of breath  CARDIOVASCULAR: No chest pain, palpitations, dizziness, or leg swelling  GASTROINTESTINAL: No abdominal or epigastric pain. No nausea, vomiting, or hematemesis; +diarrhea. No melena or hematochezia.  NEUROLOGICAL: No headaches, memory loss, loss of strength, numbness, or tremors  MUSCULOSKELETAL: No joint pain or swelling; No muscle, back, or extremity pain      Vital Signs Last 24 Hrs  T(C): 36.1 (24 May 2024 08:46), Max: 37.2 (23 May 2024 16:53)  T(F): 97 (24 May 2024 08:46), Max: 98.9 (23 May 2024 16:53)  HR: 50 (24 May 2024 08:46) (49 - 62)  BP: 132/67 (24 May 2024 08:46) (132/67 - 166/79)  BP(mean): --  RR: 18 (24 May 2024 05:00) (18 - 18)  SpO2: 97% (24 May 2024 08:46) (97% - 98%)    Parameters below as of 24 May 2024 08:46  Patient On (Oxygen Delivery Method): room air        PHYSICAL EXAM:  GENERAL: NAD,   HEAD:  Atraumatic, Normocephalic  EYES: conjunctiva and sclera clear  NECK: Supple, No JVD  NERVOUS SYSTEM:  Alert & Oriented X3, No gross focal deficits  CHEST/LUNG: Clear to auscultation bilaterally; No rales, rhonchi, wheezing, or rubs  HEART: Regular rate and rhythm; No murmurs, rubs, or gallops  ABDOMEN: Soft, Nontender, Nondistended; Bowel sounds present  EXTREMITIES:  No edema  skin: no rashes or lesion  back: no paraspinal tenderness, no gross deformity    LABS:                        14.2   10.09 )-----------( 213      ( 24 May 2024 05:57 )             40.9     05-24    139  |  105  |  13.2  ----------------------------<  93  3.6   |  26.0  |  1.05    Ca    8.2<L>      24 May 2024 05:57  Mg     2.0     05-23    TPro  6.1<L>  /  Alb  3.1<L>  /  TBili  0.5  /  DBili  x   /  AST  46<H>  /  ALT  68<H>  /  AlkPhos  64  05-24      Urinalysis Basic - ( 24 May 2024 05:57 )    Color: x / Appearance: x / SG: x / pH: x  Gluc: 93 mg/dL / Ketone: x  / Bili: x / Urobili: x   Blood: x / Protein: x / Nitrite: x   Leuk Esterase: x / RBC: x / WBC x   Sq Epi: x / Non Sq Epi: x / Bacteria: x      CAPILLARY BLOOD GLUCOSE          RADIOLOGY & ADDITIONAL TESTS:    Consultant(s) Notes Reviewed:  [ x] YES  [ ] NO    Care Discussed with Consultants/Other Providers [ x] YES  [ ] NO    Plan of Care discussed with Attending [ x]YES [ ] NO

## 2024-05-24 NOTE — PROGRESS NOTE ADULT - TIME BILLING
chart review, patient evaluation
chart review, patient evaluation
patient care , labs , meds, chart review
chart review, patient evaluation
patient care , labs , meds, chart review
patient care , labs , meds , chart review
patient care , labs, meds , chart review
patient care , labs , meds, chart review

## 2024-05-24 NOTE — PROGRESS NOTE ADULT - SUBJECTIVE AND OBJECTIVE BOX
[facility]                                                INFECTIOUS DISEASES  =======================================================                   Ansonro Larry#   Avila Hunt MD#    Dillon Bashir MD*                           Jena Mehta MD*   Mirna Beckman MD*           Diplomates American Board of Internal Medicine & Infectious Diseases                  # [place] Office - Appt - Tel  [phone] Fax [phone]                * [place] Office - Appt - Tel [phone] Fax [phone]                                  Hospital Consult line:  [phone]  =======================================================      [facility]-[id number]  EDDIE PIÑA   follow up for:salmonella  feels better  2 stool softer  patient seen and examined.       I have personally reviewed the labs and data; pertinent labs and data are listed in this note; please see below.   ===================================================  REVIEW OF SYSTEMS:  CONSTITUTIONAL:  No Fever or chills  HEENT:  No diplopia or blurred vision.  No earache, sore throat or runny nose.  CARDIOVASCULAR:  No pressure, squeezing, strangling, tightness, heaviness or aching about the chest, neck, axilla or epigastrium.  RESPIRATORY:  No cough, shortness of breath  GASTROINTESTINAL:  No nausea, vomiting +diarrhea.  GENITOURINARY:  No dysuria, frequency or urgency. No Blood in urine  MUSCULOSKELETAL:  no joint aches, no muscle pain  SKIN:  No change in skin, hair or nails.  NEUROLOGIC:  No Headaches, seizures or weakness.  PSYCHIATRIC:  No disorder of thought or mood.  ENDOCRINE:  No heat or cold intolerance  HEMATOLOGICAL:  No easy bruising or bleeding.    =======================================================  Allergies    penicillin (Hives)    Intolerances    Antibiotics:  cefTRIAXone Injectable. 2000 milliGRAM(s) IV Push every 24 hours  metroNIDAZOLE    Tablet 500 milliGRAM(s) Oral three times a day    Other medications:  heparin   Injectable 5000 Unit(s) SubCutaneous every 8 hours  lactated ringers. 1000 milliLiter(s) IV Continuous <Continuous>    ======================================================  Physical Exam:  ============  Vital Signs Last 24 Hrs  T(C): 37.3 (24 May 2024 16:55), Max: 37.3 (24 May 2024 16:55)  T(F): 99.2 (24 May 2024 16:55), Max: 99.2 (24 May 2024 16:55)  HR: 53 (24 May 2024 16:55) (49 - 56)  BP: 128/76 (24 May 2024 16:55) (128/76 - 166/79)  BP(mean): --  RR: 18 (24 May 2024 16:55) (18 - 18)  SpO2: 97% (24 May 2024 16:55) (97% - 98%)    Parameters below as of 24 May 2024 16:55  Patient On (Oxygen Delivery Method): room air        General:  No acute distress.  Eye: no conjunctival pallor, no scleral icterus    Respiratory: Lungs are clear to auscultation, Respirations are non-labored.  Cardiovascular: Normal rate, Regular rhythm,  s1+s2  Gastrointestinal: Soft, Non-tender, Non-distended, Normal bowel sounds.  Genitourinary: No costovertebral angle tenderness.    Neurologic: Alert, Oriented, No focal deficits  Psychiatric: Appropriate mood & affect.  =======================================================  Labs:                            14.2   10.09 )-----------( 213      ( 24 May 2024 05:57 )             40.9       05-24    139  |  105  |  13.2  ----------------------------<  93  3.6   |  26.0  |  1.05    Ca    8.2<L>      24 May 2024 05:57  Mg     2.0     05-23    TPro  6.1<L>  /  Alb  3.1<L>  /  TBili  0.5  /  DBili  x   /  AST  46<H>  /  ALT  68<H>  /  AlkPhos  64  05-24              Urinalysis Basic - ( 24 May 2024 05:57 )    Color: x / Appearance: x / SG: x / pH: x  Gluc: 93 mg/dL / Ketone: x  / Bili: x / Urobili: x   Blood: x / Protein: x / Nitrite: x   Leuk Esterase: x / RBC: x / WBC x   Sq Epi: x / Non Sq Epi: x / Bacteria: x                  CAPILLARY BLOOD GLUCOSE                    Urinalysis Basic - ( 23 May 2024 08:56 )    Color: x / Appearance: x / SG: x / pH: x  Gluc: 102 mg/dL / Ketone: x  / Bili: x / Urobili: x   Blood: x / Protein: x / Nitrite: x   Leuk Esterase: x / RBC: x / WBC x   Sq Epi: x / Non Sq Epi: x / Bacteria: x                  CAPILLARY BLOOD GLUCOSE                    Culture - Blood (collected 05-21-24 @ 15:00)  Source: .Blood Blood-Peripheral  Gram Stain (05-22-24 @ 10:34):    Growth in aerobic bottle: Gram Negative Rods  Preliminary Report (05-22-24 @ 10:36):    Growth in aerobic bottle: Gram Negative Rods    Direct identification is available within approximately 3-5    hours either by Blood Panel Multiplexed PCR or Direct    MALDI-TOF. Details: [email or web address]  Organism: Blood Culture PCR (05-22-24 @ 12:27)  Organism: Blood Culture PCR (05-22-24 @ 12:27)    Sensitivities:      Method Type: PCR      -  Salmonella species: Detec    Culture - Blood (collected 05-21-24 @ 14:51)  Source: .Blood Blood-Peripheral  Gram Stain (05-22-24 @ 16:03):    Growth in aerobic bottle: Gram Negative Rods  Preliminary Report (05-22-24 @ 16:03):    Growth in aerobic bottle: Gram Negative Rods    Culture - Reflex Stool (collected 05-20-24 @ 12:12)  Source: .Stool  Final Report (05-22-24 @ 14:47):    Salmonella species, not typhi/paratyphi isolated    Culture - Stool (collected 05-20-24 @ 12:12)  Source: .Stool Feces  Final Report (05-22-24 @ 21:02):    Moderate Aeromonas hydrophila/caviae complex "Susceptibilities not    performed"    (Stool culture examined for Salmonella,    Shigella, Campylobacter, Aeromonas, Plesiomonas,    Vibrio, E.coli O157 and Yersinia)

## 2024-05-24 NOTE — PROGRESS NOTE ADULT - SUBJECTIVE AND OBJECTIVE BOX
NEPHROLOGY INTERVAL HPI/OVERNIGHT EVENTS:    Examined  Feeling better  Diarrhea improved  Denies HA CP no SOB  Family visiting    MEDICATIONS  (STANDING):  cefTRIAXone Injectable. 2000 milliGRAM(s) IV Push every 24 hours  heparin   Injectable 5000 Unit(s) SubCutaneous every 8 hours  lactated ringers. 1000 milliLiter(s) (100 mL/Hr) IV Continuous <Continuous>  metroNIDAZOLE    Tablet 500 milliGRAM(s) Oral three times a day    MEDICATIONS  (PRN):  acetaminophen     Tablet .. 650 milliGRAM(s) Oral every 6 hours PRN Temp greater or equal to 38C (100.4F), Mild Pain (1 - 3)  aluminum hydroxide/magnesium hydroxide/simethicone Suspension 30 milliLiter(s) Oral every 4 hours PRN Dyspepsia  melatonin 3 milliGRAM(s) Oral at bedtime PRN Insomnia  ondansetron Injectable 4 milliGRAM(s) IV Push every 8 hours PRN Nausea and/or Vomiting      Allergies    penicillin (Hives)    Intolerances        Vital Signs Last 24 Hrs  T(C): 36.1 (24 May 2024 08:46), Max: 37.2 (23 May 2024 16:53)  T(F): 97 (24 May 2024 08:46), Max: 98.9 (23 May 2024 16:53)  HR: 50 (24 May 2024 08:46) (49 - 62)  BP: 132/67 (24 May 2024 08:46) (132/67 - 166/79)  BP(mean): --  RR: 18 (24 May 2024 05:00) (18 - 18)  SpO2: 97% (24 May 2024 08:46) (97% - 98%)    Parameters below as of 24 May 2024 08:46  Patient On (Oxygen Delivery Method): room air        PHYSICAL EXAM:  GENERAL: NAD  EYES:  EOMI  NECK: Supple, No JVD/Bruit  NERVOUS SYSTEM:  A/O x3  CHEST:  No rales or rhonchi  HEART:  RRR, No murmur  ABDOMEN: Soft, NT/ND BS+  EXTREMITIES:  No Edema    LABS:                        14.2   10.09 )-----------( 213      ( 24 May 2024 05:57 )             40.9     05-24    139  |  105  |  13.2  ----------------------------<  93  3.6   |  26.0  |  1.05    Ca    8.2<L>      24 May 2024 05:57  Mg     2.0     05-23    TPro  6.1<L>  /  Alb  3.1<L>  /  TBili  0.5  /  DBili  x   /  AST  46<H>  /  ALT  68<H>  /  AlkPhos  64  05-24      Urinalysis Basic - ( 24 May 2024 05:57 )    Color: x / Appearance: x / SG: x / pH: x  Gluc: 93 mg/dL / Ketone: x  / Bili: x / Urobili: x   Blood: x / Protein: x / Nitrite: x   Leuk Esterase: x / RBC: x / WBC x   Sq Epi: x / Non Sq Epi: x / Bacteria: x              RADIOLOGY & ADDITIONAL TESTS:

## 2024-05-24 NOTE — PROGRESS NOTE ADULT - ASSESSMENT
48 yr old male with WILFREDO on CPAP presents with complaints of weakness and diarrhea for 4 days. [place] was [place], returned on Thursday. He reports eating a local delicacy prior to his flight. He reports he felt sick and sore on his flight back, started to have vomiting and multiple episodes of watery stools with some runny nose. He went to urgent care on Friday and was told he has Influenza, was prescribed Tamiflu and Clindamycin, which he did not take. He continued to have poor oral intake, with ongoing vomiting and diarrhea since. He felt cramps in his LE today and felt dehydrated hence came in for evaluation. Felt chills, fever, took one Ibuprofen 800 mg yesterday. No headaches, abdominal pain. (19 May 2024 17:07)    Salmonella Bacteremia/ Gastroenteritis  Bandemia  Influenza A  Penicillin allergy-hives as a baby  s/p JOSIE  - improving  -  patient had "blood sausage" in [place] and symptoms started later that night. had drinks made with ice otherwise had bottled water  - GI PCR + salmonella, epec EAEC  - f/u stool cx, salmonella species not typhi/paratyphi  -  BCX + salmonella f/u ID+S- bactrim (S)  - repeat BCX x 2 sent 5/23  - c/w ceftriaxone day 4. -no reactions noted  - Tte wnl  - Continue flagyl  - defer tamiflu-out of the window  - Trend Fever  - Trend WBC   - trend cr, improving  - droplet iso for influenza per infection control    awaiting negative bcx, if remain negative plan  po Bactrim DS 2 tabs BID end 6/5. outpatient f/u 2 weeks    d/w Dr Power, pharmacy

## 2024-05-24 NOTE — PROGRESS NOTE ADULT - ASSESSMENT
48 yr old male with WILFREDO on CPAP presents with complaints of weakness and diarrhea for 4 days. admitted for flu +, acute renal failure. Labs reveal elevated Hb, creatinine 3.9 with HCO3 20. Elevated LFTs.     Diarrhea 2/2  Bacterial Gastroenteritis, recent travel to [place]  Still with diarrhea with improving amount. Improved appetite/ tenesmus/ diarrhea on abx  GI PCR with salmonella - non-typhoid, e coli- epec and eaec  initial blood cultures with salmonella, f/u resistance  - flagyl 500 tid and ceftriaxone 2g, tolerated amoxicillin before, pending salmonella speciation/resistance  - f/u salmonella speciation on culture /resistance  continue IVF given diarrhea  - repeat blood cultures pending  - id consult  - echo without vegetation  - 1 week after completion of antibiotics, repeat stool culture and blood culture  - outpatient ID follow up    JOSIE with metabolic acidosis, resolved  pre-renal in the setting of bacterial gastroenteritis normalized s/p ivf. US without acute findings of hydronephrosis or mass.  Continue IVF given poor appetite  monitor renal function  nephrology consult    Tranaminimtis:  mild, persistent despite clinical gastroenteritis improvement. neg nur sign and no upper abdominal tenderness.  - f/u hep panel  - continue to monitor    Back pain  - no paraspinal tenderness. Will image if persistent and non-positional given salmonella bacteremia. versus msk cause.  - education provided to pt, pt agree to be more mobile today.    Influenza A  past time period for tamiflu     WILFREDO on CPAP:  chronic, uses cpap at home  - CPAP at night during hospital stay     DVT ppx:  SCDs

## 2024-05-25 LAB
ALBUMIN SERPL ELPH-MCNC: 3.1 G/DL — LOW (ref 3.3–5.2)
ALP SERPL-CCNC: 63 U/L — SIGNIFICANT CHANGE UP (ref 40–120)
ALT FLD-CCNC: 72 U/L — HIGH
ANION GAP SERPL CALC-SCNC: 8 MMOL/L — SIGNIFICANT CHANGE UP (ref 5–17)
AST SERPL-CCNC: 61 U/L — HIGH
BASOPHILS # BLD AUTO: 0.06 K/UL — SIGNIFICANT CHANGE UP (ref 0–0.2)
BASOPHILS NFR BLD AUTO: 0.6 % — SIGNIFICANT CHANGE UP (ref 0–2)
BILIRUB SERPL-MCNC: 0.6 MG/DL — SIGNIFICANT CHANGE UP (ref 0.4–2)
BUN SERPL-MCNC: 11.8 MG/DL — SIGNIFICANT CHANGE UP (ref 8–20)
CALCIUM SERPL-MCNC: 8.4 MG/DL — SIGNIFICANT CHANGE UP (ref 8.4–10.5)
CHLORIDE SERPL-SCNC: 104 MMOL/L — SIGNIFICANT CHANGE UP (ref 96–108)
CO2 SERPL-SCNC: 28 MMOL/L — SIGNIFICANT CHANGE UP (ref 22–29)
CREAT SERPL-MCNC: 1.4 MG/DL — HIGH (ref 0.5–1.3)
EGFR: 62 ML/MIN/1.73M2 — SIGNIFICANT CHANGE UP
EOSINOPHIL # BLD AUTO: 0.13 K/UL — SIGNIFICANT CHANGE UP (ref 0–0.5)
EOSINOPHIL NFR BLD AUTO: 1.2 % — SIGNIFICANT CHANGE UP (ref 0–6)
GLUCOSE SERPL-MCNC: 98 MG/DL — SIGNIFICANT CHANGE UP (ref 70–99)
HCT VFR BLD CALC: 42.1 % — SIGNIFICANT CHANGE UP (ref 39–50)
HGB BLD-MCNC: 14.1 G/DL — SIGNIFICANT CHANGE UP (ref 13–17)
IMM GRANULOCYTES NFR BLD AUTO: 4 % — HIGH (ref 0–0.9)
LYMPHOCYTES # BLD AUTO: 2.84 K/UL — SIGNIFICANT CHANGE UP (ref 1–3.3)
LYMPHOCYTES # BLD AUTO: 27 % — SIGNIFICANT CHANGE UP (ref 13–44)
MAGNESIUM SERPL-MCNC: 2 MG/DL — SIGNIFICANT CHANGE UP (ref 1.6–2.6)
MCHC RBC-ENTMCNC: 28 PG — SIGNIFICANT CHANGE UP (ref 27–34)
MCHC RBC-ENTMCNC: 33.5 GM/DL — SIGNIFICANT CHANGE UP (ref 32–36)
MCV RBC AUTO: 83.7 FL — SIGNIFICANT CHANGE UP (ref 80–100)
MONOCYTES # BLD AUTO: 1.03 K/UL — HIGH (ref 0–0.9)
MONOCYTES NFR BLD AUTO: 9.8 % — SIGNIFICANT CHANGE UP (ref 2–14)
NEUTROPHILS # BLD AUTO: 6.03 K/UL — SIGNIFICANT CHANGE UP (ref 1.8–7.4)
NEUTROPHILS NFR BLD AUTO: 57.4 % — SIGNIFICANT CHANGE UP (ref 43–77)
PLATELET # BLD AUTO: 240 K/UL — SIGNIFICANT CHANGE UP (ref 150–400)
POTASSIUM SERPL-MCNC: 4.1 MMOL/L — SIGNIFICANT CHANGE UP (ref 3.5–5.3)
POTASSIUM SERPL-SCNC: 4.1 MMOL/L — SIGNIFICANT CHANGE UP (ref 3.5–5.3)
PROT SERPL-MCNC: 5.9 G/DL — LOW (ref 6.6–8.7)
RBC # BLD: 5.03 M/UL — SIGNIFICANT CHANGE UP (ref 4.2–5.8)
RBC # FLD: 12.7 % — SIGNIFICANT CHANGE UP (ref 10.3–14.5)
SODIUM SERPL-SCNC: 140 MMOL/L — SIGNIFICANT CHANGE UP (ref 135–145)
WBC # BLD: 10.51 K/UL — HIGH (ref 3.8–10.5)
WBC # FLD AUTO: 10.51 K/UL — HIGH (ref 3.8–10.5)

## 2024-05-25 PROCEDURE — 99232 SBSQ HOSP IP/OBS MODERATE 35: CPT

## 2024-05-25 RX ORDER — LACTOBACILLUS ACIDOPHILUS 100MM CELL
1 CAPSULE ORAL DAILY
Refills: 0 | Status: DISCONTINUED | OUTPATIENT
Start: 2024-05-25 | End: 2024-05-27

## 2024-05-25 RX ORDER — SODIUM CHLORIDE 9 MG/ML
1000 INJECTION, SOLUTION INTRAVENOUS
Refills: 0 | Status: DISCONTINUED | OUTPATIENT
Start: 2024-05-25 | End: 2024-05-26

## 2024-05-25 RX ADMIN — ONDANSETRON 4 MILLIGRAM(S): 8 TABLET, FILM COATED ORAL at 21:07

## 2024-05-25 RX ADMIN — Medication 500 MILLIGRAM(S): at 05:52

## 2024-05-25 RX ADMIN — Medication 3 MILLIGRAM(S): at 21:04

## 2024-05-25 RX ADMIN — ONDANSETRON 4 MILLIGRAM(S): 8 TABLET, FILM COATED ORAL at 13:32

## 2024-05-25 RX ADMIN — Medication 500 MILLIGRAM(S): at 21:04

## 2024-05-25 RX ADMIN — CEFTRIAXONE 2000 MILLIGRAM(S): 500 INJECTION, POWDER, FOR SOLUTION INTRAMUSCULAR; INTRAVENOUS at 13:26

## 2024-05-25 RX ADMIN — Medication 500 MILLIGRAM(S): at 13:26

## 2024-05-25 NOTE — PROGRESS NOTE ADULT - ASSESSMENT
48 yr old male with WILFREDO on CPAP presents with complaints of weakness and diarrhea for 4 days. admitted for flu +, acute renal failure. Labs reveal elevated Hb, creatinine 3.9 with HCO3 20. Elevated LFTs.    Diarrhea 2/2  Bacterial Gastroenteritis, recent travel to [place]  GI PCR with salmonella - non-typhoid, e coli- epec and eaec  initial blood cultures with salmonella  flagyl 500 tid and ceftriaxone 2g, tolerated amoxicillin before, pending salmonella speciation/resistance  continue IVF given diarrhea  repeat blood cultures pending  - id consulted  - echo without vegetation  - 1 week after completion of antibiotics, repeat stool culture and blood culture  - outpatient ID follow up    JOSIE with metabolic acidosis, resolved  pre-renal in the setting of bacterial gastroenteritis normalized s/p ivf. US without acute findings of hydronephrosis or mass.  Continue IVF given poor appetite  monitor renal function  nephrology consult    Tranaminimtis:  mild, persistent despite clinical gastroenteritis improvement. neg nur sign and no upper abdominal tenderness.  - f/u hep panel - pending  - continue to monitor    Back pain  - no paraspinal tenderness. Will image if persistent and non-positional given salmonella bacteremia. versus msk cause.  - education provided to pt, pt agree to be more mobile today.    Influenza A  past time period for tamiflu     WILFREDO on CPAP:  chronic, uses cpap at home  - CPAP at night during hospital stay     DVT ppx:  SCDs

## 2024-05-25 NOTE — PROGRESS NOTE ADULT - ASSESSMENT
48 yr old male with WILFREDO on CPAP presents with complaints of weakness and diarrhea for 4 days. admitted for flu +, acute renal failure.    JOSIE: vol depletion from GI losses (salmonella)---> slowly resolving - Cr 1.0 --> 1.4     Influenza A+ Sx resolved largely  - cont IVF adjusted today   - supportive care    Monitor labs will follow

## 2024-05-25 NOTE — PROGRESS NOTE ADULT - SUBJECTIVE AND OBJECTIVE BOX
NEPHROLOGY INTERVAL HPI/OVERNIGHT EVENTS:    Examined  Feeling better  Diarrhea persists   Denies HA CP no SOB    MEDICATIONS  (STANDING):  cefTRIAXone Injectable. 2000 milliGRAM(s) IV Push every 24 hours  heparin   Injectable 5000 Unit(s) SubCutaneous every 8 hours  metroNIDAZOLE    Tablet 500 milliGRAM(s) Oral three times a day    MEDICATIONS  (PRN):  acetaminophen     Tablet .. 650 milliGRAM(s) Oral every 6 hours PRN Temp greater or equal to 38C (100.4F), Mild Pain (1 - 3)  aluminum hydroxide/magnesium hydroxide/simethicone Suspension 30 milliLiter(s) Oral every 4 hours PRN Dyspepsia  melatonin 3 milliGRAM(s) Oral at bedtime PRN Insomnia  ondansetron Injectable 4 milliGRAM(s) IV Push every 8 hours PRN Nausea and/or Vomiting      Allergies    penicillin (Hives)    Intolerances        Vital Signs Last 24 Hrs  T(C): 37.1 (25 May 2024 09:46), Max: 37.3 (24 May 2024 16:55)  T(F): 98.8 (25 May 2024 09:46), Max: 99.2 (24 May 2024 16:55)  HR: 55 (25 May 2024 09:46) (53 - 55)  BP: 132/76 (25 May 2024 09:46) (122/77 - 144/80)  BP(mean): --  RR: 19 (25 May 2024 09:46) (18 - 19)  SpO2: 98% (25 May 2024 09:46) (96% - 98%)    Parameters below as of 25 May 2024 09:46  Patient On (Oxygen Delivery Method): room air      Daily     Daily     PHYSICAL EXAM:  GENERAL: NAD  EYES:  EOMI  NECK: Supple, No JVD/Bruit  NERVOUS SYSTEM:  A/O x3  CHEST:  No rales or rhonchi  HEART:  RRR, No murmur  ABDOMEN: Soft, NT/ND BS+  EXTREMITIES:  No Edema    LABS:                        14.1   10.51 )-----------( 240      ( 25 May 2024 06:39 )             42.1     05-25    140  |  104  |  11.8  ----------------------------<  98  4.1   |  28.0  |  1.40<H>    Ca    8.4      25 May 2024 06:39  Mg     2.0     05-25    TPro  5.9<L>  /  Alb  3.1<L>  /  TBili  0.6  /  DBili  x   /  AST  61<H>  /  ALT  72<H>  /  AlkPhos  63  05-25      Urinalysis Basic - ( 25 May 2024 06:39 )    Color: x / Appearance: x / SG: x / pH: x  Gluc: 98 mg/dL / Ketone: x  / Bili: x / Urobili: x   Blood: x / Protein: x / Nitrite: x   Leuk Esterase: x / RBC: x / WBC x   Sq Epi: x / Non Sq Epi: x / Bacteria: x      Magnesium: 2.0 mg/dL (05-25 @ 06:39)          RADIOLOGY & ADDITIONAL TESTS:

## 2024-05-25 NOTE — PROGRESS NOTE ADULT - SUBJECTIVE AND OBJECTIVE BOX
[facility] Division of Hospital Medicine    SUBJECTIVE / OVERNIGHT EVENTS:  Frequency improving however stools still loose    Patient denies chest pain, SOB, abd pain, N/V, fever, chills, dysuria or any other complaints. All remainder ROS negative.     MEDICATIONS  (STANDING):  cefTRIAXone Injectable. 2000 milliGRAM(s) IV Push every 24 hours  heparin   Injectable 5000 Unit(s) SubCutaneous every 8 hours  lactated ringers. 1000 milliLiter(s) (100 mL/Hr) IV Continuous <Continuous>  metroNIDAZOLE    Tablet 500 milliGRAM(s) Oral three times a day    MEDICATIONS  (PRN):  acetaminophen     Tablet .. 650 milliGRAM(s) Oral every 6 hours PRN Temp greater or equal to 38C (100.4F), Mild Pain (1 - 3)  aluminum hydroxide/magnesium hydroxide/simethicone Suspension 30 milliLiter(s) Oral every 4 hours PRN Dyspepsia  melatonin 3 milliGRAM(s) Oral at bedtime PRN Insomnia  ondansetron Injectable 4 milliGRAM(s) IV Push every 8 hours PRN Nausea and/or Vomiting        I&O's Summary    24 May 2024 07:01  -  25 May 2024 07:00  --------------------------------------------------------  IN: 1350 mL / OUT: 0 mL / NET: 1350 mL        PHYSICAL EXAM:  Vital Signs Last 24 Hrs  T(C): 37.1 (25 May 2024 09:46), Max: 37.3 (24 May 2024 16:55)  T(F): 98.8 (25 May 2024 09:46), Max: 99.2 (24 May 2024 16:55)  HR: 55 (25 May 2024 09:46) (53 - 55)  BP: 132/76 (25 May 2024 09:46) (122/77 - 144/80)  BP(mean): --  RR: 19 (25 May 2024 09:46) (18 - 19)  SpO2: 98% (25 May 2024 09:46) (96% - 98%)    Parameters below as of 25 May 2024 09:46  Patient On (Oxygen Delivery Method): room air            CONSTITUTIONAL: NAD, appears stated age  ENMT: Moist oral mucosa, no pharyngeal injection or exudates; normal dentition  RESPIRATORY: Normal respiratory effort; clear to auscultation bilaterally  CARDIOVASCULAR: Regular rate and rhythm, normal S1 and S2, no murmur/rub/gallop; Peripheral pulses are 2+ bilaterally  ABDOMEN: Nontender to palpation, normoactive bowel sounds, no rebound/guarding;   MUSCLOSKELETAL:  No clubbing or cyanosis of digits; no joint swelling or tenderness to palpation  PSYCH: A+O to person, place, and time; affect appropriate  NEUROLOGY: CN 2-12 are intact and symmetric; no gross sensory deficits;   SKIN: No rashes; no palpable lesions    LABS:                        14.1   10.51 )-----------( 240      ( 25 May 2024 06:39 )             42.1     05-25    140  |  104  |  11.8  ----------------------------<  98  4.1   |  28.0  |  1.40<H>    Ca    8.4      25 May 2024 06:39  Mg     2.0     05-25    TPro  5.9<L>  /  Alb  3.1<L>  /  TBili  0.6  /  DBili  x   /  AST  61<H>  /  ALT  72<H>  /  AlkPhos  63  05-25          Urinalysis Basic - ( 25 May 2024 06:39 )    Color: x / Appearance: x / SG: x / pH: x  Gluc: 98 mg/dL / Ketone: x  / Bili: x / Urobili: x   Blood: x / Protein: x / Nitrite: x   Leuk Esterase: x / RBC: x / WBC x   Sq Epi: x / Non Sq Epi: x / Bacteria: x        Culture - Blood (collected 23 May 2024 11:24)  Source: .Blood Blood-Peripheral  Preliminary Report (24 May 2024 18:01):    No growth at 24 hours    Culture - Blood (collected 23 May 2024 11:23)  Source: .Blood Blood-Venous  Preliminary Report (24 May 2024 18:01):    No growth at 24 hours      CAPILLARY BLOOD GLUCOSE            RADIOLOGY & ADDITIONAL TESTS:  Results Reviewed:   Imaging Personally Reviewed:  Electrocardiogram Personally Reviewed:

## 2024-05-26 ENCOUNTER — TRANSCRIPTION ENCOUNTER (OUTPATIENT)
Age: 49
End: 2024-05-26

## 2024-05-26 LAB
ANION GAP SERPL CALC-SCNC: 10 MMOL/L — SIGNIFICANT CHANGE UP (ref 5–17)
BASOPHILS # BLD AUTO: 0.07 K/UL — SIGNIFICANT CHANGE UP (ref 0–0.2)
BASOPHILS NFR BLD AUTO: 0.7 % — SIGNIFICANT CHANGE UP (ref 0–2)
BUN SERPL-MCNC: 11.1 MG/DL — SIGNIFICANT CHANGE UP (ref 8–20)
CALCIUM SERPL-MCNC: 8.3 MG/DL — LOW (ref 8.4–10.5)
CHLORIDE SERPL-SCNC: 105 MMOL/L — SIGNIFICANT CHANGE UP (ref 96–108)
CO2 SERPL-SCNC: 27 MMOL/L — SIGNIFICANT CHANGE UP (ref 22–29)
CREAT SERPL-MCNC: 1.24 MG/DL — SIGNIFICANT CHANGE UP (ref 0.5–1.3)
EGFR: 72 ML/MIN/1.73M2 — SIGNIFICANT CHANGE UP
EOSINOPHIL # BLD AUTO: 0.12 K/UL — SIGNIFICANT CHANGE UP (ref 0–0.5)
EOSINOPHIL NFR BLD AUTO: 1.1 % — SIGNIFICANT CHANGE UP (ref 0–6)
GLUCOSE SERPL-MCNC: 104 MG/DL — HIGH (ref 70–99)
HAV IGM SER-ACNC: SIGNIFICANT CHANGE UP
HBV CORE IGM SER-ACNC: SIGNIFICANT CHANGE UP
HBV SURFACE AG SER-ACNC: SIGNIFICANT CHANGE UP
HCT VFR BLD CALC: 41.4 % — SIGNIFICANT CHANGE UP (ref 39–50)
HCV AB S/CO SERPL IA: 0.16 S/CO — SIGNIFICANT CHANGE UP (ref 0–0.99)
HCV AB SERPL-IMP: SIGNIFICANT CHANGE UP
HGB BLD-MCNC: 13.8 G/DL — SIGNIFICANT CHANGE UP (ref 13–17)
IMM GRANULOCYTES NFR BLD AUTO: 3.8 % — HIGH (ref 0–0.9)
LYMPHOCYTES # BLD AUTO: 2.6 K/UL — SIGNIFICANT CHANGE UP (ref 1–3.3)
LYMPHOCYTES # BLD AUTO: 24.3 % — SIGNIFICANT CHANGE UP (ref 13–44)
MAGNESIUM SERPL-MCNC: 1.9 MG/DL — SIGNIFICANT CHANGE UP (ref 1.6–2.6)
MCHC RBC-ENTMCNC: 28 PG — SIGNIFICANT CHANGE UP (ref 27–34)
MCHC RBC-ENTMCNC: 33.3 GM/DL — SIGNIFICANT CHANGE UP (ref 32–36)
MCV RBC AUTO: 84 FL — SIGNIFICANT CHANGE UP (ref 80–100)
MONOCYTES # BLD AUTO: 1.03 K/UL — HIGH (ref 0–0.9)
MONOCYTES NFR BLD AUTO: 9.6 % — SIGNIFICANT CHANGE UP (ref 2–14)
NEUTROPHILS # BLD AUTO: 6.48 K/UL — SIGNIFICANT CHANGE UP (ref 1.8–7.4)
NEUTROPHILS NFR BLD AUTO: 60.5 % — SIGNIFICANT CHANGE UP (ref 43–77)
PHOSPHATE SERPL-MCNC: 3.3 MG/DL — SIGNIFICANT CHANGE UP (ref 2.4–4.7)
PLATELET # BLD AUTO: 267 K/UL — SIGNIFICANT CHANGE UP (ref 150–400)
POTASSIUM SERPL-MCNC: 3.6 MMOL/L — SIGNIFICANT CHANGE UP (ref 3.5–5.3)
POTASSIUM SERPL-SCNC: 3.6 MMOL/L — SIGNIFICANT CHANGE UP (ref 3.5–5.3)
RBC # BLD: 4.93 M/UL — SIGNIFICANT CHANGE UP (ref 4.2–5.8)
RBC # FLD: 12.9 % — SIGNIFICANT CHANGE UP (ref 10.3–14.5)
SODIUM SERPL-SCNC: 141 MMOL/L — SIGNIFICANT CHANGE UP (ref 135–145)
WBC # BLD: 10.71 K/UL — HIGH (ref 3.8–10.5)
WBC # FLD AUTO: 10.71 K/UL — HIGH (ref 3.8–10.5)

## 2024-05-26 PROCEDURE — 99232 SBSQ HOSP IP/OBS MODERATE 35: CPT

## 2024-05-26 RX ORDER — SODIUM CHLORIDE 9 MG/ML
1000 INJECTION, SOLUTION INTRAVENOUS
Refills: 0 | Status: DISCONTINUED | OUTPATIENT
Start: 2024-05-26 | End: 2024-05-27

## 2024-05-26 RX ADMIN — Medication 500 MILLIGRAM(S): at 22:11

## 2024-05-26 RX ADMIN — SODIUM CHLORIDE 100 MILLILITER(S): 9 INJECTION, SOLUTION INTRAVENOUS at 10:57

## 2024-05-26 RX ADMIN — SODIUM CHLORIDE 100 MILLILITER(S): 9 INJECTION, SOLUTION INTRAVENOUS at 22:13

## 2024-05-26 RX ADMIN — ONDANSETRON 4 MILLIGRAM(S): 8 TABLET, FILM COATED ORAL at 22:50

## 2024-05-26 RX ADMIN — ONDANSETRON 4 MILLIGRAM(S): 8 TABLET, FILM COATED ORAL at 14:44

## 2024-05-26 RX ADMIN — Medication 3 MILLIGRAM(S): at 22:11

## 2024-05-26 RX ADMIN — Medication 1 TABLET(S): at 14:44

## 2024-05-26 RX ADMIN — Medication 500 MILLIGRAM(S): at 05:57

## 2024-05-26 RX ADMIN — Medication 500 MILLIGRAM(S): at 14:44

## 2024-05-26 RX ADMIN — CEFTRIAXONE 2000 MILLIGRAM(S): 500 INJECTION, POWDER, FOR SOLUTION INTRAMUSCULAR; INTRAVENOUS at 14:44

## 2024-05-26 NOTE — PROGRESS NOTE ADULT - ASSESSMENT
48 yr old male with WILFREDO on CPAP presents with complaints of weakness and diarrhea for 4 days. admitted for flu +, acute renal failure. Labs reveal elevated Hb, creatinine 3.9 with HCO3 20. Elevated LFTs.    Diarrhea 2/2  Bacterial Gastroenteritis, recent travel to [place]  GI PCR with salmonella - non-typhoid, e coli- epec and eaec  initial blood cultures with salmonella, sensitive to bactrim/ ampicillin/ ctx  flagyl 500 tid and ceftriaxone 2g, tolerated amoxicillin before, pending salmonella speciation/resistance  continue IVF given diarrhea? stop?  repeat blood cultures pending - ngtd  - id consulted  - echo without vegetation  po Bactrim DS 2 tabs BID end 6/5. outpatient f/u 2 weeks  - 1 week after completion of antibiotics, repeat stool culture and blood culture  - outpatient ID follow up    Tranaminimtis:  mild, persistent despite clinical gastroenteritis improvement. neg nur sign and no upper abdominal tenderness.  - f/u hep panel - pending  - continue to monitor    JOSIE with metabolic acidosis, resolved  pre-renal in the setting of bacterial gastroenteritis normalized s/p ivf. US without acute findings of hydronephrosis or mass.  Continue IVF given poor appetite  monitor renal function  nephrology consulted    Back pain  - no paraspinal tenderness. Will image if persistent and non-positional given salmonella bacteremia. versus msk cause.  - education provided to pt, pt agree to be more mobile today.    Influenza A  past time period for tamiflu     WILFREDO on CPAP:  chronic, uses cpap at home  - CPAP at night during hospital stay     DVT ppx:  SCDs 48 yr old male with WILFREDO on CPAP presents with complaints of weakness and diarrhea for 4 days. admitted for flu +, acute renal failure. Labs reveal elevated Hb, creatinine 3.9 with HCO3 20. Elevated LFTs.    Diarrhea 2/2  Bacterial Gastroenteritis, recent travel to [place]  GI PCR with salmonella - non-typhoid, e coli- epec and eaec  initial blood cultures with salmonella, sensitive to bactrim/ ampicillin/ ctx  - continue flagyl 500 tid and ceftriaxone 2g, tolerated amoxicillin before  continue IVF given diarrhea  repeat blood cultures pending - ngtd in 2days  - id consulted  - echo without vegetation  - d/c regimen: po Bactrim DS 2 tabs BID end 6/5. outpatient f/u 2 weeks  - 1 week after completion of antibiotics, repeat stool culture and blood culture  - outpatient ID follow up    Tranaminimtis:  mild, persistent despite clinical gastroenteritis improvement. neg nur sign and no upper abdominal tenderness.  - hep panel neg  - outpatient follow up    JOSIE with metabolic acidosis, resolved  pre-renal in the setting of bacterial gastroenteritis normalized s/p ivf. US without acute findings of hydronephrosis or mass.  Continue IVF given poor appetite  monitor renal function  nephrology consulted    Back pain  - no paraspinal tenderness. Will image if persistent and non-positional given salmonella bacteremia. versus msk cause.  - education provided to pt, pt agree to be more mobile today.    Influenza A  past time period for tamiflu     WILFREDO on CPAP:  chronic, uses cpap at home  - CPAP at night during hospital stay     DVT ppx:  SCDs  active: monitor today given mild leukocytosis/ pend bc  may dc tomorrow if bc still negative

## 2024-05-26 NOTE — PROGRESS NOTE ADULT - SUBJECTIVE AND OBJECTIVE BOX
NEPHROLOGY INTERVAL HPI/OVERNIGHT EVENTS:    Examined  Feeling better  Diarrhea persists   Denies HA CP no SOB    MEDICATIONS  (STANDING):  cefTRIAXone Injectable. 2000 milliGRAM(s) IV Push every 24 hours  heparin   Injectable 5000 Unit(s) SubCutaneous every 8 hours  lactated ringers. 1000 milliLiter(s) (100 mL/Hr) IV Continuous <Continuous>  lactobacillus acidophilus 1 Tablet(s) Oral daily  metroNIDAZOLE    Tablet 500 milliGRAM(s) Oral three times a day    MEDICATIONS  (PRN):  acetaminophen     Tablet .. 650 milliGRAM(s) Oral every 6 hours PRN Temp greater or equal to 38C (100.4F), Mild Pain (1 - 3)  aluminum hydroxide/magnesium hydroxide/simethicone Suspension 30 milliLiter(s) Oral every 4 hours PRN Dyspepsia  melatonin 3 milliGRAM(s) Oral at bedtime PRN Insomnia  ondansetron Injectable 4 milliGRAM(s) IV Push every 8 hours PRN Nausea and/or Vomiting      Allergies    penicillin (Hives)    Intolerances        Vital Signs Last 24 Hrs  T(C): 36.7 (26 May 2024 08:40), Max: 36.9 (25 May 2024 20:00)  T(F): 98.1 (26 May 2024 08:40), Max: 98.4 (25 May 2024 20:00)  HR: 56 (26 May 2024 08:40) (50 - 60)  BP: 122/70 (26 May 2024 08:40) (115/67 - 149/86)  BP(mean): --  RR: 19 (26 May 2024 08:40) (18 - 19)  SpO2: 99% (26 May 2024 08:40) (96% - 99%)    Parameters below as of 26 May 2024 08:40  Patient On (Oxygen Delivery Method): room air    PHYSICAL EXAM:  GENERAL: NAD  EYES:  EOMI  NECK: Supple, No JVD/Bruit  NERVOUS SYSTEM:  A/O x3  CHEST:  No rales or rhonchi  HEART:  RRR, No murmur  ABDOMEN: Soft, NT/ND BS+  EXTREMITIES:  No Edema    LABS:                        13.8   10.71 )-----------( 267      ( 26 May 2024 06:12 )             41.4     05-26    141  |  105  |  11.1  ----------------------------<  104<H>  3.6   |  27.0  |  1.24    Ca    8.3<L>      26 May 2024 06:12  Phos  3.3     05-26  Mg     1.9     05-26    TPro  5.9<L>  /  Alb  3.1<L>  /  TBili  0.6  /  DBili  x   /  AST  61<H>  /  ALT  72<H>  /  AlkPhos  63  05-25      Urinalysis Basic - ( 26 May 2024 06:12 )    Color: x / Appearance: x / SG: x / pH: x  Gluc: 104 mg/dL / Ketone: x  / Bili: x / Urobili: x   Blood: x / Protein: x / Nitrite: x   Leuk Esterase: x / RBC: x / WBC x   Sq Epi: x / Non Sq Epi: x / Bacteria: x      Magnesium: 1.9 mg/dL (05-26 @ 06:12)  Phosphorus: 3.3 mg/dL (05-26 @ 06:12)          RADIOLOGY & ADDITIONAL TESTS:

## 2024-05-26 NOTE — DISCHARGE NOTE PROVIDER - NSDCMRMEDTOKEN_GEN_ALL_CORE_FT
Flonase 50 mcg/inh nasal spray: 1 spray(s) in each nostril 2 times a day  Testro  mg/mL intramuscular solution: 130 milligram(s) intramuscular every 7 days   Bactrim  mg-160 mg oral tablet: 2 tab(s) orally 2 times a day  Flonase 50 mcg/inh nasal spray: 1 spray(s) in each nostril 2 times a day  Testro  mg/mL intramuscular solution: 130 milligram(s) intramuscular every 7 days

## 2024-05-26 NOTE — DISCHARGE NOTE PROVIDER - HOSPITAL COURSE
48 yr old male with WILFREDO on CPAP presents with 4 days of diarrhea. Admitted for salmonella bacteremia, salmonella/ epec/ eaec bacterial gastroenteritis, and acute renal failure. Workup also with mild transaminitis and low bicarb and flu +. Placed on isolation precautions.    #Salmonella bacteremia,   # Salmonella/ epec/ eaec bacterial gastroenteritis  # Bacterial Gastroenteritis, recent travel to [place]  GI PCR with salmonella - non-typhoid, e coli- epec and eaec. Initial blood cultures with salmonella sensitive to bactrim/ ampicillin/ ctx  Repeat surveillance blood cultures with ngtd 3days. Echo without vegetations.  Patient recently returned from trip to [place], had "blood sausage" and had drinks made with ice.    Bacterial Gastroenteritis, recent travel to [place].  GI PCR with salmonella - non-typhoid, e coli- epec and eaec.  - Patient to complete 2 week course of antibiotics: 6 days iv antibiotics and to complete 14 additional days with bactrim.   continue IVF given diarrhea  - discharge regimen: po Bactrim DS 2 tabs BID end 6/5. outpatient f/u 2 weeks  - follow up final surveillance blood culture  - 1 week after completion of antibiotics, repeat stool culture and blood culture  - outpatient ID follow up    Transaminitis:  Mild. Possibly in setting of bacterial gastroenteritis but persistent despite clinical improvement. neg nur sign and no upper abdominal tenderness. Hepatitis panel neg  - outpatient follow up    JOSIE with metabolic acidosis, resolved  Pre-renal in the setting of bacterial gastroenteritis and normalized s/p fluids. Renal US without acute findings of hydronephrosis or mass.  Nephrology consulted.    Influenza A, resolved  No hypoxia. past time period for tamiflu     WILFREDO on CPAP:  chronic, patient to resume cpap at home   48 yr old male with WILFREDO on CPAP presents with 4 days of diarrhea. Admitted for salmonella bacteremia, salmonella/ epec/ eaec bacterial gastroenteritis, and acute renal failure. Workup also with mild transaminitis and low bicarb and flu +. Placed on isolation precautions.    #Salmonella bacteremia,   # Salmonella/ epec/ eaec bacterial gastroenteritis  # Bacterial Gastroenteritis, recent travel to [place]  GI PCR with salmonella - non-typhoid, e coli- epec and eaec. Initial blood cultures with salmonella sensitive to bactrim/ ampicillin/ ctx  Repeat surveillance blood cultures with ngtd 3days. Echo without vegetations.  Patient recently returned from trip to [place], had "blood sausage" and had drinks made with ice.    Bacterial Gastroenteritis, recent travel to [place].  GI PCR with salmonella - non-typhoid, e coli- epec and eaec.  - Patient to complete 2 week course of antibiotics: 6 days iv antibiotics and to complete 14 additional days with bactrim.   continue IVF given diarrhea  - discharge regimen: po Bactrim DS 2 tabs BID end 6/5. outpatient f/u 2 weeks  - follow up final surveillance blood culture  - 1 week after completion of antibiotics, repeat stool culture and blood culture  - outpatient ID follow up    #Transaminitis:  Mild. Possibly in setting of bacterial gastroenteritis but persistent despite clinical improvement. neg nur sign and no upper abdominal tenderness. Hepatitis panel neg  - outpatient follow up    # right cephalic vein thrombus  Low suspicion thrombophlebitis, superficial vein. Mild tenderness to palpation, no erythema/ edema/  Possibly the cause of leukocytosis  - strict return precautions given  - warm compresses    #JOSIE with metabolic acidosis, resolved  Pre-renal in the setting of bacterial gastroenteritis and normalized s/p fluids. Renal US without acute findings of hydronephrosis or mass.  Nephrology consulted.    Influenza A, resolved  No hypoxia. past time period for tamiflu     WILFREDO on CPAP:  chronic, patient to resume cpap at home   48 yr old male with WILFREDO on CPAP presents with 4 days of diarrhea. Admitted for salmonella bacteremia, salmonella/ epec/ eaec bacterial gastroenteritis, and acute renal failure. Workup also with mild transaminitis and low bicarb and flu +. Placed on isolation precautions.    #Salmonella bacteremia,   # Salmonella/ epec/ eaec bacterial gastroenteritis  # Bacterial Gastroenteritis, recent travel to [place]  GI PCR with salmonella - non-typhoid, e coli- epec and eaec. Initial blood cultures with salmonella sensitive to bactrim/ ampicillin/ ctx  Repeat surveillance blood cultures with ngtd 3days. Echo without vegetations.  Patient recently returned from trip to [place], had "blood sausage" and had drinks made with ice.    Bacterial Gastroenteritis, recent travel to [place].  GI PCR with salmonella - non-typhoid, e coli- epec and eaec.  - Patient to complete 2 week course of antibiotics: 6 days iv antibiotics and to complete 14 additional days with bactrim.   continue IVF given diarrhea  - discharge regimen: po Bactrim DS 2 tabs BID end 6/5. outpatient f/u 2 weeks  - follow up final surveillance blood culture  - 1 week after completion of antibiotics, repeat stool culture and blood culture  - outpatient ID follow up    #Transaminitis:  Mild. Possibly in setting of bacterial gastroenteritis but persistent despite clinical improvement. neg nur sign and no upper abdominal tenderness. Hepatitis panel neg  - outpatient follow up    # right cephalic vein thrombus  Low suspicion thrombophlebitis, superficial vein. Mild tenderness to palpation, no erythema/ edema/  Possibly the cause of leukocytosis  - strict return precautions given  - warm compresses  - iv removed  - no need anticoagulation given superficial vein    #JOSIE with metabolic acidosis, resolved  Pre-renal in the setting of bacterial gastroenteritis and normalized s/p fluids. Renal US without acute findings of hydronephrosis or mass.  Nephrology consulted.    Influenza A, resolved  No hypoxia. past time period for tamiflu     WILFREDO on CPAP:  chronic, patient to resume cpap at home   48 yr old male with WILFREDO on CPAP presents with 4 days of diarrhea. Admitted for salmonella bacteremia, salmonella/ epec/ eaec bacterial gastroenteritis, and acute renal failure. Workup also with mild transaminitis and low bicarb and flu +. Placed on isolation precautions.    #Salmonella bacteremia,   # Salmonella/ epec/ eaec bacterial gastroenteritis  # Bacterial Gastroenteritis, recent travel to [place]  GI PCR with salmonella - non-typhoid, e coli- epec and eaec. Initial blood cultures with salmonella sensitive to bactrim/ ampicillin/ ctx  Repeat surveillance blood cultures with ngtd 3days. Echo without vegetations.  Patient recently returned from trip to [place], had "blood sausage" and had drinks made with ice.    Bacterial Gastroenteritis, recent travel to [place].  GI PCR with salmonella - non-typhoid, e coli- epec and eaec.  - Patient to complete 2 week course of antibiotics: 6 days iv antibiotics and to complete 9 additional days with bactrim.   continue IVF given diarrhea  - discharge regimen: po Bactrim DS 2 tabs BID end 6/5.  - follow up final surveillance blood culture  - 1 week after completion of antibiotics, repeat stool culture and blood culture  - outpatient ID follow up in 2 weeks    #Transaminitis:  Mild. Possibly in setting of bacterial gastroenteritis but persistent despite clinical improvement. neg nur sign and no upper abdominal tenderness. Hepatitis panel neg  - outpatient follow up    # right cephalic vein thrombus  Low suspicion thrombophlebitis, superficial vein. Mild tenderness to palpation, no erythema/ edema/  Possibly the cause of leukocytosis  - strict return precautions given  - warm compresses  - iv removed  - no need anticoagulation given superficial vein    #JOSIE with metabolic acidosis, resolved  Pre-renal in the setting of bacterial gastroenteritis and normalized s/p fluids. Renal US without acute findings of hydronephrosis or mass.  Nephrology consulted.    Influenza A, resolved  No hypoxia. past time period for tamiflu     WILFREDO on CPAP:  chronic, patient to resume cpap at home      Patient medically stable for discharge home. Strict return precautions given. Follow up with pcp and ID in 2 weeks.

## 2024-05-26 NOTE — PROGRESS NOTE ADULT - ASSESSMENT
48 yr old male with WILFREDO on CPAP presents with complaints of weakness and diarrhea for 4 days. admitted for flu +, acute renal failure.    JOSIE: vol depletion from GI losses (salmonella)---> slowly resolving - Cr 1.0 --> 1.4 --> 1.2    Influenza A+ Sx resolved largely  - cont IVF while he has diarrhea  - supportive care    Monitor labs will follow

## 2024-05-26 NOTE — DISCHARGE NOTE PROVIDER - ATTENDING DISCHARGE PHYSICAL EXAMINATION:
GENERAL: NAD,   HEAD:  Atraumatic, Normocephalic  EYES: conjunctiva and sclera clear  NECK: Supple, No JVD  NERVOUS SYSTEM:  Alert & Oriented X3, No gross focal deficits  CHEST/LUNG: Clear to auscultation bilaterally; No rales, rhonchi, wheezing, or rubs  HEART: Regular rate and rhythm; No murmurs, rubs, or gallops  ABDOMEN: Soft, Nontender, Nondistended; Bowel sounds present  EXTREMITIES:  No edema GENERAL: NAD,   HEAD:  Atraumatic, Normocephalic  EYES: conjunctiva and sclera clear  NECK: Supple, No JVD  NERVOUS SYSTEM:  Alert & Oriented X3, No gross focal deficits  CHEST/LUNG: Clear to auscultation bilaterally; No rales, rhonchi, wheezing, or rubs  HEART: Regular rate and rhythm; No murmurs, rubs, or gallops  ABDOMEN: Soft, Nontender, Nondistended; Bowel sounds present  EXTREMITIES:  No edema. Right arm with small palpable cord in cephalic vein. Mild tenderness to palpation, no erythema/ edema/ drainage

## 2024-05-26 NOTE — DISCHARGE NOTE PROVIDER - CARE PROVIDER_API CALL
Jena Mehta  Infectious Disease  [address] [place] [id number]  Phone: [phone]  Fax: [phone]  Follow Up Time: 2 weeks

## 2024-05-26 NOTE — PROGRESS NOTE ADULT - SUBJECTIVE AND OBJECTIVE BOX
Patient is a 48y old  Male who presents with a chief complaint of weakness (25 May 2024 14:29)      INTERVAL HPI/OVERNIGHT EVENTS:    No acute overnight events.  Patient denies chest pain, dyspnea, abdominal pain, fever/chills    MEDICATIONS  (STANDING):  cefTRIAXone Injectable. 2000 milliGRAM(s) IV Push every 24 hours  heparin   Injectable 5000 Unit(s) SubCutaneous every 8 hours  lactated ringers. 1000 milliLiter(s) (100 mL/Hr) IV Continuous <Continuous>  lactobacillus acidophilus 1 Tablet(s) Oral daily  metroNIDAZOLE    Tablet 500 milliGRAM(s) Oral three times a day    MEDICATIONS  (PRN):  acetaminophen     Tablet .. 650 milliGRAM(s) Oral every 6 hours PRN Temp greater or equal to 38C (100.4F), Mild Pain (1 - 3)  aluminum hydroxide/magnesium hydroxide/simethicone Suspension 30 milliLiter(s) Oral every 4 hours PRN Dyspepsia  melatonin 3 milliGRAM(s) Oral at bedtime PRN Insomnia  ondansetron Injectable 4 milliGRAM(s) IV Push every 8 hours PRN Nausea and/or Vomiting      Allergies    penicillin (Hives)    Intolerances        REVIEW OF SYSTEMS:  CONSTITUTIONAL: No fever, weight loss, or fatigue  RESPIRATORY: No cough, wheezing, chills or hemoptysis; No shortness of breath  CARDIOVASCULAR: No chest pain, palpitations, dizziness, or leg swelling  GASTROINTESTINAL: No abdominal or epigastric pain. No nausea, vomiting, or hematemesis; No diarrhea or constipation. No melena or hematochezia.  NEUROLOGICAL: No headaches, memory loss, loss of strength, numbness, or tremors  MUSCULOSKELETAL: No joint pain or swelling; No muscle, back, or extremity pain      Vital Signs Last 24 Hrs  T(C): 36.8 (26 May 2024 04:33), Max: 37.1 (25 May 2024 09:46)  T(F): 98.3 (26 May 2024 04:33), Max: 98.8 (25 May 2024 09:46)  HR: 51 (26 May 2024 04:33) (50 - 60)  BP: 115/67 (26 May 2024 04:33) (115/67 - 149/86)  BP(mean): --  RR: 18 (26 May 2024 04:33) (18 - 19)  SpO2: 99% (26 May 2024 04:33) (96% - 99%)    Parameters below as of 26 May 2024 04:33  Patient On (Oxygen Delivery Method): room air        PHYSICAL EXAM:  GENERAL: NAD,   HEAD:  Atraumatic, Normocephalic  EYES: conjunctiva and sclera clear  NECK: Supple, No JVD  NERVOUS SYSTEM:  Alert & Oriented X3, No gross focal deficits  CHEST/LUNG: Clear to auscultation bilaterally; No rales, rhonchi, wheezing, or rubs  HEART: Regular rate and rhythm; No murmurs, rubs, or gallops  ABDOMEN: Soft, Nontender, Nondistended; Bowel sounds present  EXTREMITIES:  No edema    LABS:                        13.8   10.71 )-----------( 267      ( 26 May 2024 06:12 )             41.4     05-26    141  |  105  |  11.1  ----------------------------<  104<H>  3.6   |  27.0  |  1.24    Ca    8.3<L>      26 May 2024 06:12  Phos  3.3     05-26  Mg     1.9     05-26    TPro  5.9<L>  /  Alb  3.1<L>  /  TBili  0.6  /  DBili  x   /  AST  61<H>  /  ALT  72<H>  /  AlkPhos  63  05-25      Urinalysis Basic - ( 26 May 2024 06:12 )    Color: x / Appearance: x / SG: x / pH: x  Gluc: 104 mg/dL / Ketone: x  / Bili: x / Urobili: x   Blood: x / Protein: x / Nitrite: x   Leuk Esterase: x / RBC: x / WBC x   Sq Epi: x / Non Sq Epi: x / Bacteria: x      CAPILLARY BLOOD GLUCOSE          RADIOLOGY & ADDITIONAL TESTS:    Imaging Personally Reviewed:  [x ] YES  [ ] NO    Consultant(s) Notes Reviewed:  [ x] YES  [ ] NO    Care Discussed with Consultants/Other Providers [ x] YES  [ ] NO    Plan of Care discussed with Attending [ x]YES [ ] NO Patient is a 48y old  Male who presents with a chief complaint of weakness (25 May 2024 14:29)      INTERVAL HPI/OVERNIGHT EVENTS:    No acute overnight events. 2 episodes diarrhea in am, + abdominal discomfort  Patient denies chest pain, dyspnea, abdominal pain, fever/chills    MEDICATIONS  (STANDING):  cefTRIAXone Injectable. 2000 milliGRAM(s) IV Push every 24 hours  heparin   Injectable 5000 Unit(s) SubCutaneous every 8 hours  lactated ringers. 1000 milliLiter(s) (100 mL/Hr) IV Continuous <Continuous>  lactobacillus acidophilus 1 Tablet(s) Oral daily  metroNIDAZOLE    Tablet 500 milliGRAM(s) Oral three times a day    MEDICATIONS  (PRN):  acetaminophen     Tablet .. 650 milliGRAM(s) Oral every 6 hours PRN Temp greater or equal to 38C (100.4F), Mild Pain (1 - 3)  aluminum hydroxide/magnesium hydroxide/simethicone Suspension 30 milliLiter(s) Oral every 4 hours PRN Dyspepsia  melatonin 3 milliGRAM(s) Oral at bedtime PRN Insomnia  ondansetron Injectable 4 milliGRAM(s) IV Push every 8 hours PRN Nausea and/or Vomiting      Allergies    penicillin (Hives)    Intolerances        REVIEW OF SYSTEMS:  CONSTITUTIONAL: No fever, weight loss, or fatigue  RESPIRATORY: No cough, wheezing, chills or hemoptysis; No shortness of breath  CARDIOVASCULAR: No chest pain, palpitations, dizziness, or leg swelling  GASTROINTESTINAL: No abdominal or epigastric pain. No nausea, vomiting, or hematemesis; +diarrhea. No melena or hematochezia.  NEUROLOGICAL: No headaches, memory loss, loss of strength, numbness, or tremors  MUSCULOSKELETAL: No joint pain or swelling; No muscle, back, or extremity pain      Vital Signs Last 24 Hrs  T(C): 36.8 (26 May 2024 04:33), Max: 37.1 (25 May 2024 09:46)  T(F): 98.3 (26 May 2024 04:33), Max: 98.8 (25 May 2024 09:46)  HR: 51 (26 May 2024 04:33) (50 - 60)  BP: 115/67 (26 May 2024 04:33) (115/67 - 149/86)  BP(mean): --  RR: 18 (26 May 2024 04:33) (18 - 19)  SpO2: 99% (26 May 2024 04:33) (96% - 99%)    Parameters below as of 26 May 2024 04:33  Patient On (Oxygen Delivery Method): room air        PHYSICAL EXAM:  GENERAL: NAD,   HEAD:  Atraumatic, Normocephalic  EYES: conjunctiva and sclera clear  NECK: Supple, No JVD  NERVOUS SYSTEM:  Alert & Oriented X3, No gross focal deficits  CHEST/LUNG: Clear to auscultation bilaterally; No rales, rhonchi, wheezing, or rubs  HEART: Regular rate and rhythm; No murmurs, rubs, or gallops  ABDOMEN: Soft, Nontender, Nondistended; Bowel sounds present  EXTREMITIES:  No edema    LABS:                        13.8   10.71 )-----------( 267      ( 26 May 2024 06:12 )             41.4     05-26    141  |  105  |  11.1  ----------------------------<  104<H>  3.6   |  27.0  |  1.24    Ca    8.3<L>      26 May 2024 06:12  Phos  3.3     05-26  Mg     1.9     05-26    TPro  5.9<L>  /  Alb  3.1<L>  /  TBili  0.6  /  DBili  x   /  AST  61<H>  /  ALT  72<H>  /  AlkPhos  63  05-25      Urinalysis Basic - ( 26 May 2024 06:12 )    Color: x / Appearance: x / SG: x / pH: x  Gluc: 104 mg/dL / Ketone: x  / Bili: x / Urobili: x   Blood: x / Protein: x / Nitrite: x   Leuk Esterase: x / RBC: x / WBC x   Sq Epi: x / Non Sq Epi: x / Bacteria: x      CAPILLARY BLOOD GLUCOSE          RADIOLOGY & ADDITIONAL TESTS:      Care Discussed with Consultants/Other Providers [ x] YES  [ ] NO    Plan of Care discussed with Attending [ x]YES [ ] NO

## 2024-05-26 NOTE — DISCHARGE NOTE PROVIDER - NSDCCPCAREPLAN_GEN_ALL_CORE_FT
PRINCIPAL DISCHARGE DIAGNOSIS  Diagnosis: Bacterial gastroenteritis  Assessment and Plan of Treatment:       SECONDARY DISCHARGE DIAGNOSES  Diagnosis: Salmonella bacteremia  Assessment and Plan of Treatment:     Diagnosis: Type A influenza  Assessment and Plan of Treatment:     Diagnosis: JOSIE (acute kidney injury)  Assessment and Plan of Treatment:     Diagnosis: Transaminitis  Assessment and Plan of Treatment:      PRINCIPAL DISCHARGE DIAGNOSIS  Diagnosis: Bacterial gastroenteritis  Assessment and Plan of Treatment: Blood culture had Salmonella   Stool culture had Salmonella and E. coli  - take 9 days of oral antibiotics: Bactrim 2 tabs twice a day (ending 6/5).   - follow up with the infectious disease doctor in 2 weeks  - 1 week after you complete antibiotics, repeat a stool culture and blood culture        SECONDARY DISCHARGE DIAGNOSES  Diagnosis: Transaminitis  Assessment and Plan of Treatment: Mildly elevated liver enzymes, hepatitis panel negative  follow up with your primary care doctor    Diagnosis: Salmonella bacteremia  Assessment and Plan of Treatment: see above    Diagnosis: JOSIE (acute kidney injury)  Assessment and Plan of Treatment: Resolved  Follow up with your primary care doctor    Diagnosis: Type A influenza  Assessment and Plan of Treatment:      PRINCIPAL DISCHARGE DIAGNOSIS  Diagnosis: Bacterial gastroenteritis  Assessment and Plan of Treatment: Blood culture had Salmonella   Stool culture had Salmonella and E. coli  - take 9 days of oral antibiotics: Bactrim 2 tabs twice a day (ending 6/5).   - follow up with the infectious disease doctor in 2 weeks  - 1 week after you complete antibiotics, repeat a stool culture and blood culture        SECONDARY DISCHARGE DIAGNOSES  Diagnosis: Cephalic vein thrombosis  Assessment and Plan of Treatment: Return immediately to the ED if there is increased pain, swelling, or redness of the skin.  - use warm compresses    Diagnosis: Transaminitis  Assessment and Plan of Treatment: Mildly elevated liver enzymes, hepatitis panel negative  follow up with your primary care doctor    Diagnosis: Salmonella bacteremia  Assessment and Plan of Treatment: see above    Diagnosis: JOSIE (acute kidney injury)  Assessment and Plan of Treatment: Resolved  Follow up with your primary care doctor    Diagnosis: Type A influenza  Assessment and Plan of Treatment:

## 2024-05-27 ENCOUNTER — TRANSCRIPTION ENCOUNTER (OUTPATIENT)
Age: 49
End: 2024-05-27

## 2024-05-27 VITALS
HEART RATE: 57 BPM | TEMPERATURE: 98 F | OXYGEN SATURATION: 98 % | SYSTOLIC BLOOD PRESSURE: 160 MMHG | RESPIRATION RATE: 18 BRPM | DIASTOLIC BLOOD PRESSURE: 81 MMHG

## 2024-05-27 LAB
ANION GAP SERPL CALC-SCNC: 10 MMOL/L — SIGNIFICANT CHANGE UP (ref 5–17)
ANISOCYTOSIS BLD QL: SLIGHT — SIGNIFICANT CHANGE UP
APPEARANCE UR: CLEAR — SIGNIFICANT CHANGE UP
BACTERIA # UR AUTO: NEGATIVE /HPF — SIGNIFICANT CHANGE UP
BASOPHILS # BLD AUTO: 0 K/UL — SIGNIFICANT CHANGE UP (ref 0–0.2)
BASOPHILS NFR BLD AUTO: 0 % — SIGNIFICANT CHANGE UP (ref 0–2)
BILIRUB UR-MCNC: NEGATIVE — SIGNIFICANT CHANGE UP
BUN SERPL-MCNC: 11.6 MG/DL — SIGNIFICANT CHANGE UP (ref 8–20)
CALCIUM SERPL-MCNC: 8.3 MG/DL — LOW (ref 8.4–10.5)
CAST: 0 /LPF — SIGNIFICANT CHANGE UP (ref 0–4)
CHLORIDE SERPL-SCNC: 104 MMOL/L — SIGNIFICANT CHANGE UP (ref 96–108)
CO2 SERPL-SCNC: 26 MMOL/L — SIGNIFICANT CHANGE UP (ref 22–29)
COLOR SPEC: YELLOW — SIGNIFICANT CHANGE UP
CREAT SERPL-MCNC: 1.18 MG/DL — SIGNIFICANT CHANGE UP (ref 0.5–1.3)
DACRYOCYTES BLD QL SMEAR: SLIGHT — SIGNIFICANT CHANGE UP
DIFF PNL FLD: NEGATIVE — SIGNIFICANT CHANGE UP
EGFR: 76 ML/MIN/1.73M2 — SIGNIFICANT CHANGE UP
EOSINOPHIL # BLD AUTO: 0.13 K/UL — SIGNIFICANT CHANGE UP (ref 0–0.5)
EOSINOPHIL NFR BLD AUTO: 0.9 % — SIGNIFICANT CHANGE UP (ref 0–6)
GLUCOSE SERPL-MCNC: 98 MG/DL — SIGNIFICANT CHANGE UP (ref 70–99)
GLUCOSE UR QL: NEGATIVE MG/DL — SIGNIFICANT CHANGE UP
HCT VFR BLD CALC: 40.9 % — SIGNIFICANT CHANGE UP (ref 39–50)
HGB BLD-MCNC: 13.7 G/DL — SIGNIFICANT CHANGE UP (ref 13–17)
KETONES UR-MCNC: NEGATIVE MG/DL — SIGNIFICANT CHANGE UP
LEUKOCYTE ESTERASE UR-ACNC: NEGATIVE — SIGNIFICANT CHANGE UP
LYMPHOCYTES # BLD AUTO: 1.65 K/UL — SIGNIFICANT CHANGE UP (ref 1–3.3)
LYMPHOCYTES # BLD AUTO: 11.5 % — LOW (ref 13–44)
MAGNESIUM SERPL-MCNC: 1.9 MG/DL — SIGNIFICANT CHANGE UP (ref 1.6–2.6)
MANUAL SMEAR VERIFICATION: SIGNIFICANT CHANGE UP
MCHC RBC-ENTMCNC: 28.1 PG — SIGNIFICANT CHANGE UP (ref 27–34)
MCHC RBC-ENTMCNC: 33.5 GM/DL — SIGNIFICANT CHANGE UP (ref 32–36)
MCV RBC AUTO: 84 FL — SIGNIFICANT CHANGE UP (ref 80–100)
METAMYELOCYTES # FLD: 0.9 % — HIGH (ref 0–0)
MONOCYTES # BLD AUTO: 0.89 K/UL — SIGNIFICANT CHANGE UP (ref 0–0.9)
MONOCYTES NFR BLD AUTO: 6.2 % — SIGNIFICANT CHANGE UP (ref 2–14)
MYELOCYTES NFR BLD: 0.9 % — HIGH (ref 0–0)
NEUTROPHILS # BLD AUTO: 10.3 K/UL — HIGH (ref 1.8–7.4)
NEUTROPHILS NFR BLD AUTO: 69.9 % — SIGNIFICANT CHANGE UP (ref 43–77)
NEUTS BAND # BLD: 1.8 % — SIGNIFICANT CHANGE UP (ref 0–8)
NITRITE UR-MCNC: NEGATIVE — SIGNIFICANT CHANGE UP
PH UR: 7 — SIGNIFICANT CHANGE UP (ref 5–8)
PHOSPHATE SERPL-MCNC: 3.4 MG/DL — SIGNIFICANT CHANGE UP (ref 2.4–4.7)
PLAT MORPH BLD: NORMAL — SIGNIFICANT CHANGE UP
PLATELET # BLD AUTO: 269 K/UL — SIGNIFICANT CHANGE UP (ref 150–400)
POIKILOCYTOSIS BLD QL AUTO: SLIGHT — SIGNIFICANT CHANGE UP
POLYCHROMASIA BLD QL SMEAR: SLIGHT — SIGNIFICANT CHANGE UP
POTASSIUM SERPL-MCNC: 3.5 MMOL/L — SIGNIFICANT CHANGE UP (ref 3.5–5.3)
POTASSIUM SERPL-SCNC: 3.5 MMOL/L — SIGNIFICANT CHANGE UP (ref 3.5–5.3)
PROCALCITONIN SERPL-MCNC: 0.05 NG/ML — SIGNIFICANT CHANGE UP (ref 0.02–0.1)
PROT UR-MCNC: NEGATIVE MG/DL — SIGNIFICANT CHANGE UP
RAPID RVP RESULT: SIGNIFICANT CHANGE UP
RBC # BLD: 4.87 M/UL — SIGNIFICANT CHANGE UP (ref 4.2–5.8)
RBC # FLD: 13 % — SIGNIFICANT CHANGE UP (ref 10.3–14.5)
RBC BLD AUTO: ABNORMAL
RBC CASTS # UR COMP ASSIST: 1 /HPF — SIGNIFICANT CHANGE UP (ref 0–4)
SARS-COV-2 RNA SPEC QL NAA+PROBE: SIGNIFICANT CHANGE UP
SMUDGE CELLS # BLD: PRESENT — SIGNIFICANT CHANGE UP
SODIUM SERPL-SCNC: 140 MMOL/L — SIGNIFICANT CHANGE UP (ref 135–145)
SP GR SPEC: 1.01 — SIGNIFICANT CHANGE UP (ref 1–1.03)
SQUAMOUS # UR AUTO: 0 /HPF — SIGNIFICANT CHANGE UP (ref 0–5)
UROBILINOGEN FLD QL: 0.2 MG/DL — SIGNIFICANT CHANGE UP (ref 0.2–1)
VARIANT LYMPHS # BLD: 7.9 % — HIGH (ref 0–6)
WBC # BLD: 14.36 K/UL — HIGH (ref 3.8–10.5)
WBC # FLD AUTO: 14.36 K/UL — HIGH (ref 3.8–10.5)
WBC UR QL: 0 /HPF — SIGNIFICANT CHANGE UP (ref 0–5)

## 2024-05-27 PROCEDURE — 36415 COLL VENOUS BLD VENIPUNCTURE: CPT

## 2024-05-27 PROCEDURE — 96374 THER/PROPH/DIAG INJ IV PUSH: CPT

## 2024-05-27 PROCEDURE — 87507 IADNA-DNA/RNA PROBE TQ 12-25: CPT

## 2024-05-27 PROCEDURE — 93971 EXTREMITY STUDY: CPT | Mod: 26,RT

## 2024-05-27 PROCEDURE — 85018 HEMOGLOBIN: CPT

## 2024-05-27 PROCEDURE — 87077 CULTURE AEROBIC IDENTIFY: CPT

## 2024-05-27 PROCEDURE — 80048 BASIC METABOLIC PNL TOTAL CA: CPT

## 2024-05-27 PROCEDURE — 87493 C DIFF AMPLIFIED PROBE: CPT

## 2024-05-27 PROCEDURE — 87046 STOOL CULTR AEROBIC BACT EA: CPT

## 2024-05-27 PROCEDURE — 93356 MYOCRD STRAIN IMG SPCKL TRCK: CPT

## 2024-05-27 PROCEDURE — 87045 FECES CULTURE AEROBIC BACT: CPT

## 2024-05-27 PROCEDURE — 80074 ACUTE HEPATITIS PANEL: CPT

## 2024-05-27 PROCEDURE — 87181 SC STD AGAR DILUTION PER AGT: CPT

## 2024-05-27 PROCEDURE — 99239 HOSP IP/OBS DSCHRG MGMT >30: CPT

## 2024-05-27 PROCEDURE — 85025 COMPLETE CBC W/AUTO DIFF WBC: CPT

## 2024-05-27 PROCEDURE — 84145 PROCALCITONIN (PCT): CPT

## 2024-05-27 PROCEDURE — 94660 CPAP INITIATION&MGMT: CPT

## 2024-05-27 PROCEDURE — 87040 BLOOD CULTURE FOR BACTERIA: CPT

## 2024-05-27 PROCEDURE — 84100 ASSAY OF PHOSPHORUS: CPT

## 2024-05-27 PROCEDURE — 81001 URINALYSIS AUTO W/SCOPE: CPT

## 2024-05-27 PROCEDURE — 82947 ASSAY GLUCOSE BLOOD QUANT: CPT

## 2024-05-27 PROCEDURE — 93005 ELECTROCARDIOGRAM TRACING: CPT

## 2024-05-27 PROCEDURE — 82803 BLOOD GASES ANY COMBINATION: CPT

## 2024-05-27 PROCEDURE — 85014 HEMATOCRIT: CPT

## 2024-05-27 PROCEDURE — 76775 US EXAM ABDO BACK WALL LIM: CPT

## 2024-05-27 PROCEDURE — 84295 ASSAY OF SERUM SODIUM: CPT

## 2024-05-27 PROCEDURE — 83735 ASSAY OF MAGNESIUM: CPT

## 2024-05-27 PROCEDURE — 82435 ASSAY OF BLOOD CHLORIDE: CPT

## 2024-05-27 PROCEDURE — 85610 PROTHROMBIN TIME: CPT

## 2024-05-27 PROCEDURE — 84132 ASSAY OF SERUM POTASSIUM: CPT

## 2024-05-27 PROCEDURE — 87177 OVA AND PARASITES SMEARS: CPT

## 2024-05-27 PROCEDURE — 93971 EXTREMITY STUDY: CPT

## 2024-05-27 PROCEDURE — 87186 SC STD MICRODIL/AGAR DIL: CPT

## 2024-05-27 PROCEDURE — 83605 ASSAY OF LACTIC ACID: CPT

## 2024-05-27 PROCEDURE — 99285 EMERGENCY DEPT VISIT HI MDM: CPT

## 2024-05-27 PROCEDURE — 80053 COMPREHEN METABOLIC PANEL: CPT

## 2024-05-27 PROCEDURE — 93306 TTE W/DOPPLER COMPLETE: CPT

## 2024-05-27 PROCEDURE — 87637 SARSCOV2&INF A&B&RSV AMP PRB: CPT

## 2024-05-27 PROCEDURE — 87150 DNA/RNA AMPLIFIED PROBE: CPT

## 2024-05-27 PROCEDURE — 0225U NFCT DS DNA&RNA 21 SARSCOV2: CPT

## 2024-05-27 PROCEDURE — 82330 ASSAY OF CALCIUM: CPT

## 2024-05-27 PROCEDURE — 96375 TX/PRO/DX INJ NEW DRUG ADDON: CPT

## 2024-05-27 RX ADMIN — Medication 2 TABLET(S): at 17:43

## 2024-05-27 RX ADMIN — Medication 500 MILLIGRAM(S): at 13:30

## 2024-05-27 RX ADMIN — SODIUM CHLORIDE 100 MILLILITER(S): 9 INJECTION, SOLUTION INTRAVENOUS at 06:13

## 2024-05-27 RX ADMIN — Medication 500 MILLIGRAM(S): at 06:14

## 2024-05-27 RX ADMIN — CEFTRIAXONE 2000 MILLIGRAM(S): 500 INJECTION, POWDER, FOR SOLUTION INTRAMUSCULAR; INTRAVENOUS at 13:30

## 2024-05-27 NOTE — PROGRESS NOTE ADULT - ATTENDING COMMENTS
Salmonella Colitis, Bacteremia  Blood cultures negative  Leukocytosis increasing despite improvement of GI symptoms   Eval IV site with US to r/o abscess or thrombophlebitis. check rvp, ua  cont abx  monitor leukocytosis .
diarrhea slowly improving   denies any fever chills   mild occasional abd cramping   echo no vegetation noted   pending repeat bcxs
Salmonella Colitis, Bacteremia  Follow final cultures  cont abx  monitor leukocytosis
patient awake, comfortable. , still has diarrhea but less amount   bcxs pos for salmonella   c/w iv CTX   f/u repeat bcxs   id following   echo pending
patient seen and eval. c/o diarrhea   recent travel to [place]   check gi pcr/ stool cxs/ cdiff   ivf   monitor cr
patient with salmonella diarrhea   also noted bcxs positive for salmonella   repeat bcxs ordered   c/w ceftriaxone iv   c/w ivf   monitor electrolytes / replete
diarrhea little better   stool pcr noted for salmonella/ ecoli , awaiting for cxs  check bcxs   started on cipro/ flagyl   id consulted   c/w ivf

## 2024-05-27 NOTE — DISCHARGE NOTE NURSING/CASE MANAGEMENT/SOCIAL WORK - NSDCPEFALRISK_GEN_ALL_CORE
For information on Fall & Injury Prevention, visit: [email or web address] OR  [email or web address] OR  https://www.cdc.gov/steadi/patient.html

## 2024-05-27 NOTE — PROGRESS NOTE ADULT - PROVIDER SPECIALTY LIST ADULT
Infectious Disease
Infectious Disease
Internal Medicine
Nephrology
Infectious Disease
Internal Medicine
Nephrology
Nephrology
Internal Medicine
Nephrology
Nephrology
Hospitalist
Internal Medicine
Internal Medicine
Nephrology
Nephrology

## 2024-05-27 NOTE — PROGRESS NOTE ADULT - ASSESSMENT
· Assessment	  48 yr old male with WILFREDO on CPAP presents with complaints of weakness and diarrhea for 4 days. admitted for flu +, acute renal failure. Labs reveal elevated Hb, creatinine 3.9 with HCO3 20. Elevated LFTs.    Diarrhea 2/2  Bacterial Gastroenteritis, recent travel to [place]  GI PCR with salmonella - non-typhoid, e coli- epec and eaec  initial blood cultures with salmonella, sensitive to bactrim/ ampicillin/ ctx  - continue flagyl 500 tid and ceftriaxone 2g, tolerated amoxicillin before  continue IVF given diarrhea  repeat blood cultures pending - ngtd in 2days  - id consulted  - echo without vegetation  - d/c regimen: po Bactrim DS 2 tabs BID end 6/5. outpatient f/u 2 weeks  - 1 week after completion of antibiotics, repeat stool culture and blood culture  - outpatient ID follow up    leukocytosis with left shift, metamyelocytes/ myelocte.   5/27: - Uptrending leukocytosis 14. Clinically improving in terms of salmonella/e coli gastroenteritis with 2 episodes diarrhea. No symptoms of infection elsewhere. Surveilance bc negative for 3 days.   - f/u rvp, procal/ ua  - id recs appreciated    Tranaminimtis:  mild, persistent despite clinical gastroenteritis improvement. neg nur sign and no upper abdominal tenderness.  - hep panel neg  - outpatient follow up    JOSIE with metabolic acidosis, resolved  pre-renal in the setting of bacterial gastroenteritis normalized s/p ivf. US without acute findings of hydronephrosis or mass.  Continue IVF given poor appetite  monitor renal function  nephrology consulted    Back pain  - no paraspinal tenderness. Will image if persistent and non-positional given salmonella bacteremia. versus msk cause.  - education provided to pt, pt agree to be more mobile today.    Influenza A  past time period for tamiflu     WILFREDO on CPAP:  chronic, uses cpap at home  - CPAP at night during hospital stay     DVT ppx:  SCDs  active: active given leukocytosis   · Assessment	  48 yr old male with WILFREDO on CPAP presents with complaints of weakness and diarrhea for 4 days. admitted for flu +, acute renal failure. Labs reveal elevated Hb, creatinine 3.9 with HCO3 20. Elevated LFTs.    Diarrhea 2/2  Bacterial Gastroenteritis, recent travel to [place]  GI PCR with salmonella - non-typhoid, e coli- epec and eaec  initial blood cultures with salmonella, sensitive to bactrim/ ampicillin/ ctx  - continue flagyl 500 tid and ceftriaxone 2g, tolerated amoxicillin before  continue IVF given diarrhea  repeat blood cultures pending - ngtd in 2days  - id consulted  - echo without vegetation  - d/c regimen: po Bactrim DS 2 tabs BID end 6/5. outpatient f/u 2 weeks  - 1 week after completion of antibiotics, repeat stool culture and blood culture  - outpatient ID follow up    leukocytosis with left shift, metamyelocytes/ myelocte.   5/27: - Uptrending leukocytosis 14. Clinically improving in terms of salmonella/e coli gastroenteritis with 2 episodes diarrhea. No symptoms of infection elsewhere. Surveilance bc negative for 3 days.   - f/u rvp, procal/ ua  - id recs appreciated    Tranaminimtis:  mild, persistent despite clinical gastroenteritis improvement. neg nur sign and no upper abdominal tenderness.  - hep panel neg  - outpatient follow up    JOSIE with metabolic acidosis, resolved  pre-renal in the setting of bacterial gastroenteritis normalized s/p ivf. US without acute findings of hydronephrosis or mass.  Continue IVF given poor appetite  monitor renal function  nephrology consulted    Back pain  - no paraspinal tenderness. Will image if persistent and non-positional given salmonella bacteremia. versus msk cause.  - education provided to pt, pt agree to be more mobile today.    Influenza A  past time period for tamiflu     WILFREDO on CPAP:  chronic, uses cpap at home  - CPAP at night during hospital stay     DVT ppx:  SCDs  active: active given leukocytosis  pend id recs and rvp result

## 2024-05-27 NOTE — PROGRESS NOTE ADULT - SUBJECTIVE AND OBJECTIVE BOX
Patient is a 48y old  Male who presents with a chief complaint of weakness (26 May 2024 10:47)      INTERVAL HPI/OVERNIGHT EVENTS:    Uptrending leukocytosis with left shift, metamyelocytes.   2 episodes diarrhea the past 24 h. Still liquid stool. + tenesmus.  Patient denies chest pain, cough, dyspnea, fever/chills, dysuria, rash.    MEDICATIONS  (STANDING):  cefTRIAXone Injectable. 2000 milliGRAM(s) IV Push every 24 hours  heparin   Injectable 5000 Unit(s) SubCutaneous every 8 hours  lactated ringers. 1000 milliLiter(s) (100 mL/Hr) IV Continuous <Continuous>  lactobacillus acidophilus 1 Tablet(s) Oral daily  metroNIDAZOLE    Tablet 500 milliGRAM(s) Oral three times a day    MEDICATIONS  (PRN):  acetaminophen     Tablet .. 650 milliGRAM(s) Oral every 6 hours PRN Temp greater or equal to 38C (100.4F), Mild Pain (1 - 3)  aluminum hydroxide/magnesium hydroxide/simethicone Suspension 30 milliLiter(s) Oral every 4 hours PRN Dyspepsia  melatonin 3 milliGRAM(s) Oral at bedtime PRN Insomnia  ondansetron Injectable 4 milliGRAM(s) IV Push every 8 hours PRN Nausea and/or Vomiting      Allergies    penicillin (Hives)    Intolerances        REVIEW OF SYSTEMS:  CONSTITUTIONAL: No fever, weight loss, or fatigue  RESPIRATORY: No cough, wheezing, chills or hemoptysis; No shortness of breath  CARDIOVASCULAR: No chest pain, palpitations, dizziness, or leg swelling  GASTROINTESTINAL: No abdominal or epigastric pain. No nausea, vomiting, or hematemesis; +diarrhea . No melena or hematochezia.  NEUROLOGICAL: No headaches, memory loss, loss of strength, numbness, or tremors  MUSCULOSKELETAL: No joint pain or swelling; No muscle, back, or extremity pain      Vital Signs Last 24 Hrs  T(C): 36.4 (27 May 2024 04:31), Max: 37.3 (26 May 2024 22:15)  T(F): 97.6 (27 May 2024 04:31), Max: 99.1 (26 May 2024 22:15)  HR: 50 (27 May 2024 04:31) (50 - 68)  BP: 122/71 (27 May 2024 04:31) (122/71 - 130/62)  BP(mean): --  RR: 18 (27 May 2024 04:31) (18 - 18)  SpO2: 98% (27 May 2024 04:31) (95% - 98%)    Parameters below as of 27 May 2024 04:31  Patient On (Oxygen Delivery Method): room air        PHYSICAL EXAM:  GENERAL: NAD,   HEAD:  Atraumatic, Normocephalic  EYES: conjunctiva and sclera clear  NECK: Supple, No JVD  NERVOUS SYSTEM:  Alert & Oriented X3, No gross focal deficits  CHEST/LUNG: Clear to auscultation bilaterally; No rales, rhonchi, wheezing, or rubs  HEART: Regular rate and rhythm; No murmurs, rubs, or gallops  ABDOMEN: Soft, Nontender, Nondistended; Bowel sounds present  EXTREMITIES:  No edema    LABS:                        13.7   14.36 )-----------( 269      ( 27 May 2024 06:00 )             40.9     05-27    140  |  104  |  11.6  ----------------------------<  98  3.5   |  26.0  |  1.18    Ca    8.3<L>      27 May 2024 06:00  Phos  3.4     05-27  Mg     1.9     05-27        Urinalysis Basic - ( 27 May 2024 06:00 )    Color: x / Appearance: x / SG: x / pH: x  Gluc: 98 mg/dL / Ketone: x  / Bili: x / Urobili: x   Blood: x / Protein: x / Nitrite: x   Leuk Esterase: x / RBC: x / WBC x   Sq Epi: x / Non Sq Epi: x / Bacteria: x      CAPILLARY BLOOD GLUCOSE          RADIOLOGY & ADDITIONAL TESTS:    Consultant(s) Notes Reviewed:  [ x] YES  [ ] NO    Care Discussed with Consultants/Other Providers [ x] YES  [ ] NO    Plan of Care discussed with Attending [ x]YES [ ] NO

## 2024-05-27 NOTE — PROGRESS NOTE ADULT - SUBJECTIVE AND OBJECTIVE BOX
NEPHROLOGY INTERVAL HPI/OVERNIGHT EVENTS:    Examined  Feeling better  Diarrhea persists - some improvement  Denies HA CP no SOB    MEDICATIONS  (STANDING):  cefTRIAXone Injectable. 2000 milliGRAM(s) IV Push every 24 hours  heparin   Injectable 5000 Unit(s) SubCutaneous every 8 hours  lactated ringers. 1000 milliLiter(s) (100 mL/Hr) IV Continuous <Continuous>  metroNIDAZOLE    Tablet 500 milliGRAM(s) Oral three times a day    MEDICATIONS  (PRN):  acetaminophen     Tablet .. 650 milliGRAM(s) Oral every 6 hours PRN Temp greater or equal to 38C (100.4F), Mild Pain (1 - 3)  aluminum hydroxide/magnesium hydroxide/simethicone Suspension 30 milliLiter(s) Oral every 4 hours PRN Dyspepsia  melatonin 3 milliGRAM(s) Oral at bedtime PRN Insomnia  ondansetron Injectable 4 milliGRAM(s) IV Push every 8 hours PRN Nausea and/or Vomiting      Allergies    penicillin (Hives)    Intolerances        Vital Signs Last 24 Hrs  T(C): 36.4 (27 May 2024 04:31), Max: 37.3 (26 May 2024 22:15)  T(F): 97.6 (27 May 2024 04:31), Max: 99.1 (26 May 2024 22:15)  HR: 50 (27 May 2024 04:31) (50 - 68)  BP: 122/71 (27 May 2024 04:31) (122/71 - 130/62)  BP(mean): --  RR: 18 (27 May 2024 04:31) (18 - 18)  SpO2: 98% (27 May 2024 04:31) (95% - 98%)    Parameters below as of 27 May 2024 04:31  Patient On (Oxygen Delivery Method): room air    PHYSICAL EXAM:  GENERAL: NAD  EYES:  EOMI  NECK: Supple, No JVD/Bruit  NERVOUS SYSTEM:  A/O x3  CHEST:  No rales or rhonchi  HEART:  RRR, No murmur  ABDOMEN: Soft, NT/ND BS+  EXTREMITIES:  No Edema    LABS:                        13.7   14.36 )-----------( 269      ( 27 May 2024 06:00 )             40.9     05-27    140  |  104  |  11.6  ----------------------------<  98  3.5   |  26.0  |  1.18    Ca    8.3<L>      27 May 2024 06:00  Phos  3.4     05-27  Mg     1.9     05-27        Urinalysis Basic - ( 27 May 2024 06:00 )    Color: x / Appearance: x / SG: x / pH: x  Gluc: 98 mg/dL / Ketone: x  / Bili: x / Urobili: x   Blood: x / Protein: x / Nitrite: x   Leuk Esterase: x / RBC: x / WBC x   Sq Epi: x / Non Sq Epi: x / Bacteria: x      Magnesium: 1.9 mg/dL (05-27 @ 06:00)  Phosphorus: 3.4 mg/dL (05-27 @ 06:00)          RADIOLOGY & ADDITIONAL TESTS:

## 2024-05-27 NOTE — PROGRESS NOTE ADULT - ASSESSMENT
48 yr old male with WILFREDO on CPAP presents with complaints of weakness and diarrhea for 4 days. admitted for flu +, acute renal failure.    JOSIE: vol depletion from GI losses (salmonella)---> slowly resolving - Cr 1.0 --> 1.4 --> 1.2 --> 1.1    Influenza A+ Sx resolved largely  - cont IVF while he has diarrhea  - supportive care    Monitor labs will follow

## 2024-05-27 NOTE — DISCHARGE NOTE NURSING/CASE MANAGEMENT/SOCIAL WORK - PATIENT PORTAL LINK FT
You can access the FollowMyHealth Patient Portal offered by [facility] by registering at the following website: [email or web address]. By joining [employer]’s FollowMyHealth portal, you will also be able to view your health information using other applications (apps) compatible with our system.

## 2024-05-28 LAB
CULTURE RESULTS: SIGNIFICANT CHANGE UP
CULTURE RESULTS: SIGNIFICANT CHANGE UP
SPECIMEN SOURCE: SIGNIFICANT CHANGE UP
SPECIMEN SOURCE: SIGNIFICANT CHANGE UP

## 2024-06-07 ENCOUNTER — NON-APPOINTMENT (OUTPATIENT)
Age: 49
End: 2024-06-07

## 2024-06-07 PROBLEM — G47.33 OBSTRUCTIVE SLEEP APNEA (ADULT) (PEDIATRIC): Chronic | Status: ACTIVE | Noted: 2024-05-19

## 2024-06-10 ENCOUNTER — APPOINTMENT (OUTPATIENT)
Dept: INTERNAL MEDICINE | Facility: CLINIC | Age: 49
End: 2024-06-10
Payer: COMMERCIAL

## 2024-06-10 VITALS
DIASTOLIC BLOOD PRESSURE: 78 MMHG | HEIGHT: 69 IN | SYSTOLIC BLOOD PRESSURE: 122 MMHG | BODY MASS INDEX: 27.4 KG/M2 | WEIGHT: 185 LBS | HEART RATE: 60 BPM | TEMPERATURE: 97.2 F | RESPIRATION RATE: 16 BRPM | OXYGEN SATURATION: 98 %

## 2024-06-10 DIAGNOSIS — R78.81 BACTEREMIA: ICD-10-CM

## 2024-06-10 PROCEDURE — 99495 TRANSJ CARE MGMT MOD F2F 14D: CPT

## 2024-06-10 NOTE — PHYSICAL EXAM
[General Appearance - Alert] : alert [General Appearance - In No Acute Distress] : in no acute distress [Auscultation Breath Sounds / Voice Sounds] : lungs were clear to auscultation bilaterally [Heart Rate And Rhythm] : heart rate was normal and rhythm regular [Heart Sounds] : normal S1 and S2 [Heart Sounds Gallop] : no gallops [Murmurs] : no murmurs [Heart Sounds Pericardial Friction Rub] : no pericardial rub [Bowel Sounds] : normal bowel sounds [Abdomen Soft] : soft [Abdomen Tenderness] : non-tender [Abdomen Mass (___ Cm)] : no abdominal mass palpated [Costovertebral Angle Tenderness] : no CVA tenderness [Skin Color & Pigmentation] : normal skin color and pigmentation [] : no rash

## 2024-06-12 NOTE — ASSESSMENT
[FreeTextEntry1] : 48 yr old male with WILFREDO on CPAP presented to ED with diarrhea after recent trip to  and was found to have salmonella bacteremia and influenza A      -  patient had "[place]" in [facility] [place] on 5[place] and symptoms started later that night. had drinks made with ice otherwise had bottled water - GI PCR + salmonella, epec EAEC - stool cx  grew aeromonas -  BCX + salmonella species not typhi/paratyphii- bactrim (S) - repeat BCX x 2 sent 5/23 ngtd - was on ceftriaxone and flagyl in hospital - Tte wnl - discharged on po Bactrim DS 2 tabs BID end 6/5. - Patient completed and abx is doing well - Discussed importance of maintaining hand hygiene especially for the next month and avoiding fecal oral contamination of food etc. - check labs cbc cmp bcx - check gi pcr   will f/u labs when available f/u ID as needed [Rx Dose / Side Effects] : Rx dose/side effects [Risk Reduction] : risk reduction [Nutritional / Food Issues] : nutritional/food issues [Universal Precautions] : universal precautions [Anticipatory Guidance] : anticipatory guidance

## 2024-06-12 NOTE — HISTORY OF PRESENT ILLNESS
[FreeTextEntry1] : Hospital Course: Discharge Date	27-May-2024 Admission Date	19-May-2024 16:42 Reason for Admission	weakness Hospital Course	 48 yr old male with WILFREDO on CPAP presents with 4 days of diarrhea. Admitted for salmonella bacteremia, salmonella/ epec/ eaec bacterial gastroenteritis, and acute renal failure. Workup also with mild transaminitis and low bicarb and flu +. Placed on isolation precautions.  #Salmonella bacteremia,  # Salmonella/ epec/ eaec bacterial gastroenteritis # Bacterial Gastroenteritis, recent travel to [place] GI PCR with salmonella - non-typhoid, e coli- epec and eaec. Initial blood cultures with salmonella sensitive to bactrim/ ampicillin/ ctx Repeat surveillance blood cultures with ngtd 3days. Echo without vegetations. Patient recently returned from trip to [place], had "blood sausage" and had drinks made with ice.   Bacterial Gastroenteritis, recent travel to [place]. GI PCR with salmonella - non-typhoid, e coli- epec and eaec. - Patient to complete 2 week course of antibiotics: 6 days iv antibiotics and to complete 9 additional days with bactrim.  continue IVF given diarrhea - discharge regimen: po Bactrim DS 2 tabs BID end 6/5. - follow up final surveillance blood culture - 1 week after completion of antibiotics, repeat stool culture and blood culture - outpatient ID follow up in 2 weeks  #Transaminitis: Mild. Possibly in setting of bacterial gastroenteritis but persistent despite clinical improvement. neg nur sign and no upper abdominal tenderness. Hepatitis panel neg - outpatient follow up  # right cephalic vein thrombus Low suspicion thrombophlebitis, superficial vein. Mild tenderness to palpation, no erythema/ edema/  Possibly the cause of leukocytosis - strict return precautions given - warm compresses - iv removed - no need anticoagulation given superficial vein  #JOSIE with metabolic acidosis, resolved Pre-renal in the setting of bacterial gastroenteritis and normalized s/p fluids. Renal US without acute findings of hydronephrosis or mass. Nephrology consulted.  Influenza A, resolved No hypoxia. past time period for tamiflu   WILFREDO on CPAP: chronic, patient to resume cpap at home   Interval f/u 6/10/24- pt here for f/u visit after hospitalization as above feeling much better occasional cramping in abdomen based on what he eats-noted after korean hotpot no fever no chills no diarrhea no nausea or vomiting does not handle food/work in

## 2024-06-26 LAB
ALBUMIN SERPL ELPH-MCNC: 4.4 G/DL
ALP BLD-CCNC: 93 U/L
ALT SERPL-CCNC: 31 U/L
ANION GAP SERPL CALC-SCNC: 14 MMOL/L
AST SERPL-CCNC: 20 U/L
BACTERIA BLD CULT: NORMAL
BASOPHILS # BLD AUTO: 0.04 K/UL
BASOPHILS NFR BLD AUTO: 0.6 %
BILIRUB SERPL-MCNC: 0.7 MG/DL
BUN SERPL-MCNC: 13 MG/DL
CALCIUM SERPL-MCNC: 10.2 MG/DL
CHLORIDE SERPL-SCNC: 101 MMOL/L
CO2 SERPL-SCNC: 24 MMOL/L
CREAT SERPL-MCNC: 1.03 MG/DL
EGFR: 90 ML/MIN/1.73M2
EOSINOPHIL # BLD AUTO: 0.12 K/UL
EOSINOPHIL NFR BLD AUTO: 1.9 %
GI PCR PANEL: NOT DETECTED
GLUCOSE SERPL-MCNC: 112 MG/DL
HCT VFR BLD CALC: 47.9 %
HGB BLD-MCNC: 15.6 G/DL
IMM GRANULOCYTES NFR BLD AUTO: 0.3 %
LYMPHOCYTES # BLD AUTO: 2.62 K/UL
LYMPHOCYTES NFR BLD AUTO: 42.5 %
MAN DIFF?: NORMAL
MCHC RBC-ENTMCNC: 28.8 PG
MCHC RBC-ENTMCNC: 32.6 GM/DL
MCV RBC AUTO: 88.5 FL
MONOCYTES # BLD AUTO: 0.59 K/UL
MONOCYTES NFR BLD AUTO: 9.6 %
NEUTROPHILS # BLD AUTO: 2.78 K/UL
NEUTROPHILS NFR BLD AUTO: 45.1 %
PLATELET # BLD AUTO: 273 K/UL
POTASSIUM SERPL-SCNC: 4.4 MMOL/L
PROT SERPL-MCNC: 7.2 G/DL
RBC # BLD: 5.41 M/UL
RBC # FLD: 14.7 %
SODIUM SERPL-SCNC: 138 MMOL/L
WBC # FLD AUTO: 6.17 K/UL

## 2024-08-06 NOTE — H&P ADULT - GENERAL
EXERCISE PLAN:  An exercise plan was discussed with the doctor for Juliana to continue with current exercise routine. GOALS: Continue daily walking, home routine 34-4 times, including strength training, interval training, and core/abdominal exercises.    The patient is meeting exercise expectations.   
details…

## 2025-01-08 ENCOUNTER — NON-APPOINTMENT (OUTPATIENT)
Age: 50
End: 2025-01-08

## 2025-01-10 ENCOUNTER — APPOINTMENT (OUTPATIENT)
Dept: ORTHOPEDIC SURGERY | Facility: CLINIC | Age: 50
End: 2025-01-10
Payer: COMMERCIAL

## 2025-01-10 VITALS — WEIGHT: 186 LBS | BODY MASS INDEX: 27.55 KG/M2 | HEIGHT: 69 IN

## 2025-01-10 PROCEDURE — 73110 X-RAY EXAM OF WRIST: CPT | Mod: RT

## 2025-01-10 PROCEDURE — 99204 OFFICE O/P NEW MOD 45 MIN: CPT

## 2025-01-10 RX ORDER — MELOXICAM 15 MG/1
15 TABLET ORAL DAILY
Qty: 15 | Refills: 1 | Status: ACTIVE | COMMUNITY
Start: 2025-01-10 | End: 1900-01-01

## 2025-01-13 ENCOUNTER — OUTPATIENT (OUTPATIENT)
Dept: OUTPATIENT SERVICES | Facility: HOSPITAL | Age: 50
LOS: 1 days | End: 2025-01-13
Payer: COMMERCIAL

## 2025-01-13 ENCOUNTER — APPOINTMENT (OUTPATIENT)
Dept: MRI IMAGING | Facility: CLINIC | Age: 50
End: 2025-01-13
Payer: COMMERCIAL

## 2025-01-13 DIAGNOSIS — M25.531 PAIN IN RIGHT WRIST: ICD-10-CM

## 2025-01-13 DIAGNOSIS — S62.109D: ICD-10-CM

## 2025-01-13 DIAGNOSIS — Z87.81 PERSONAL HISTORY OF (HEALED) TRAUMATIC FRACTURE: Chronic | ICD-10-CM

## 2025-01-13 DIAGNOSIS — Z98.890 OTHER SPECIFIED POSTPROCEDURAL STATES: Chronic | ICD-10-CM

## 2025-01-13 PROCEDURE — 73221 MRI JOINT UPR EXTREM W/O DYE: CPT

## 2025-01-13 PROCEDURE — 73221 MRI JOINT UPR EXTREM W/O DYE: CPT | Mod: 26,RT

## 2025-01-15 ENCOUNTER — APPOINTMENT (OUTPATIENT)
Dept: ORTHOPEDIC SURGERY | Facility: CLINIC | Age: 50
End: 2025-01-15
Payer: COMMERCIAL

## 2025-01-15 PROBLEM — S52.509D CLOSED FRACTURE OF DISTAL END OF RADIUS WITH ROUTINE HEALING, UNSPECIFIED FRACTURE MORPHOLOGY, UNSPECIFIED LATERALITY, SUBSEQUENT ENCOUNTER: Status: ACTIVE | Noted: 2025-01-15

## 2025-01-15 PROBLEM — M25.531 RIGHT WRIST PAIN: Status: ACTIVE | Noted: 2025-01-09

## 2025-01-15 PROBLEM — S62.109D CLOSED DISPLACED FRACTURE OF CARPAL BONE WITH ROUTINE HEALING, UNSPECIFIED CARPAL BONE, UNSPECIFIED LATERALITY, SUBSEQUENT ENCOUNTER: Status: ACTIVE | Noted: 2025-01-10

## 2025-01-15 PROCEDURE — 73110 X-RAY EXAM OF WRIST: CPT | Mod: RT

## 2025-01-15 PROCEDURE — 99214 OFFICE O/P EST MOD 30 MIN: CPT

## 2025-01-22 ENCOUNTER — APPOINTMENT (OUTPATIENT)
Dept: ORTHOPEDIC SURGERY | Facility: CLINIC | Age: 50
End: 2025-01-22
Payer: COMMERCIAL

## 2025-01-22 ENCOUNTER — APPOINTMENT (OUTPATIENT)
Dept: ORTHOPEDIC SURGERY | Facility: CLINIC | Age: 50
End: 2025-01-22

## 2025-01-22 PROCEDURE — 99448 NTRPROF PH1/NTRNET/EHR 21-30: CPT

## 2025-01-22 RX ORDER — MELOXICAM 15 MG/1
15 TABLET ORAL DAILY
Qty: 15 | Refills: 1 | Status: ACTIVE | COMMUNITY
Start: 2025-01-22 | End: 1900-01-01

## 2025-01-27 ENCOUNTER — APPOINTMENT (OUTPATIENT)
Dept: ORTHOPEDIC SURGERY | Facility: CLINIC | Age: 50
End: 2025-01-27
Payer: COMMERCIAL

## 2025-01-27 DIAGNOSIS — M25.531 PAIN IN RIGHT WRIST: ICD-10-CM

## 2025-01-27 DIAGNOSIS — S62.109D FRACTURE OF UNSPECIFIED CARPAL BONE, UNSPECIFIED WRIST, SUBSEQUENT ENCOUNTER FOR FRACTURE WITH ROUTINE HEALING: ICD-10-CM

## 2025-01-27 DIAGNOSIS — S52.509D UNSPECIFIED FRACTURE OF THE LOWER END OF UNSPECIFIED RADIUS, SUBSEQUENT ENCOUNTER FOR CLOSED FRACTURE WITH ROUTINE HEALING: ICD-10-CM

## 2025-01-27 PROCEDURE — 99214 OFFICE O/P EST MOD 30 MIN: CPT

## 2025-01-27 PROCEDURE — 73110 X-RAY EXAM OF WRIST: CPT | Mod: RT

## 2025-02-07 ENCOUNTER — NON-APPOINTMENT (OUTPATIENT)
Age: 50
End: 2025-02-07

## 2025-02-11 ENCOUNTER — APPOINTMENT (OUTPATIENT)
Dept: ORTHOPEDIC SURGERY | Facility: CLINIC | Age: 50
End: 2025-02-11
Payer: COMMERCIAL

## 2025-02-11 DIAGNOSIS — S62.109D FRACTURE OF UNSPECIFIED CARPAL BONE, UNSPECIFIED WRIST, SUBSEQUENT ENCOUNTER FOR FRACTURE WITH ROUTINE HEALING: ICD-10-CM

## 2025-02-11 DIAGNOSIS — S52.509D UNSPECIFIED FRACTURE OF THE LOWER END OF UNSPECIFIED RADIUS, SUBSEQUENT ENCOUNTER FOR CLOSED FRACTURE WITH ROUTINE HEALING: ICD-10-CM

## 2025-02-11 DIAGNOSIS — M25.531 PAIN IN RIGHT WRIST: ICD-10-CM

## 2025-02-11 PROCEDURE — 99213 OFFICE O/P EST LOW 20 MIN: CPT

## 2025-02-11 RX ORDER — MELOXICAM 15 MG/1
15 TABLET ORAL DAILY
Qty: 14 | Refills: 0 | Status: ACTIVE | COMMUNITY
Start: 2025-02-11 | End: 1900-01-01

## 2025-03-26 ENCOUNTER — EMERGENCY (EMERGENCY)
Facility: HOSPITAL | Age: 50
LOS: 1 days | Discharge: HOME | End: 2025-03-26
Attending: STUDENT IN AN ORGANIZED HEALTH CARE EDUCATION/TRAINING PROGRAM
Payer: COMMERCIAL

## 2025-03-26 VITALS
DIASTOLIC BLOOD PRESSURE: 100 MMHG | WEIGHT: 168.87 LBS | TEMPERATURE: 98 F | SYSTOLIC BLOOD PRESSURE: 153 MMHG | HEART RATE: 75 BPM | OXYGEN SATURATION: 98 % | RESPIRATION RATE: 16 BRPM

## 2025-03-26 DIAGNOSIS — Z87.81 PERSONAL HISTORY OF (HEALED) TRAUMATIC FRACTURE: Chronic | ICD-10-CM

## 2025-03-26 DIAGNOSIS — Z98.890 OTHER SPECIFIED POSTPROCEDURAL STATES: Chronic | ICD-10-CM

## 2025-03-26 PROCEDURE — 99282 EMERGENCY DEPT VISIT SF MDM: CPT

## 2025-03-26 PROCEDURE — 99284 EMERGENCY DEPT VISIT MOD MDM: CPT

## 2025-03-26 RX ORDER — IBUPROFEN 200 MG
600 TABLET ORAL ONCE
Refills: 0 | Status: DISCONTINUED | OUTPATIENT
Start: 2025-03-26 | End: 2025-04-02

## 2025-03-26 RX ORDER — ACETAMINOPHEN 500 MG/5ML
975 LIQUID (ML) ORAL ONCE
Refills: 0 | Status: DISCONTINUED | OUTPATIENT
Start: 2025-03-26 | End: 2025-04-02

## 2025-03-26 NOTE — ED PROVIDER NOTE - NSFOLLOWUPINSTRUCTIONS_ED_ALL_ED_FT
1) Follow up with your doctor in 1-2 days  2) Return to the ER for worsening or concerning symptoms      Priapism    Priapism is an unwanted erection of the penis that usually develops without sexual stimulation or desire. Priapism affects males of all ages. There are three types of priapism:    Recurrent acute priapism. With this type, erections are painful and last less than 3 hours. The erections come and go.  Acute prolonged priapism. With this type, erections are painful and last hours to days. This type can lead to erectile dysfunction.  Persistent priapism. With this type, erections are usually painless and can last weeks to years. The penis gets erect but not rigid. This type can lead to erectile dysfunction.    What are the causes?  This condition develops either when blood has difficulty leaving the penis (low-flow priapism) or when too much blood flows into the penis (high-flow priapism). Blood flow issues may be caused by:    Erectile dysfunction medicine. This is the most common cause.  Medicine that is used to treat depression and anxiety.  Blood problems that are common in people who have sickle cell disease or leukemia.  Use of illegal drugs, such as cocaine and crack.  Use of marijuana.  Excessive alcohol use.  Diabetes mellitus.  Neurological problems, such as multiple sclerosis.  An injury to the penis.  An infection.    In some cases, the cause may not be known.    What are the signs or symptoms?  Symptoms of this condition include:    A prolonged erection in the absence of stimulation.  A painful erection.    How is this diagnosed?  This condition is diagnosed with a physical exam. Blood tests and imaging tests such as an ultrasound or MRI may be done to help identify the cause of the condition.    How is this treated?  Treatment for this condition depends on the cause and the type of priapism. Recurrent acute priapism is often managed at home. Acute prolonged priapism is usually treated at a hospital, where treatment may involve:    Getting fluid and medicines for pain through an IV.  Having a procedure to drain blood from the penis.  Having surgery to make a passageway for blood to flow in the penis (surgical shunting).  Having a blood transfusion if you have sickle cell disease.    No standard treatment exists for persistent priapism. Ice packs and cold showers are sometimes used to help relieve symptoms.    Follow these instructions at home:      Managing recurrent priapism      Try taking a warm bath or exercising.  Keep track of how long your erection lasts. If it does not go away in 4 hours, seek medical care.        Medicines    Take over-the-counter and prescription medicines only as told by your health care provider.  Do not take any medicines during an episode unless you get approval from your health care provider.        General instructions     Avoid sexual stimulation and intercourse until your health care provider says that they are okay for you.  Avoid drugs or alcohol if they caused the priapism. Avoiding them can help to prevent the condition from coming back.  Drink enough fluid to keep your urine pale yellow.  Empty your bladder as much as possible.  Keep all follow-up visits as told by your health care provider. This is important.    Contact a health care provider if:  Your pain gets worse.  Your pain does not improve with treatment.  You have recurrent priapism and your erection does not go away in 4 hours.    Get help right away if:  You have a fever or chills.  You have more pain, swelling, or redness in your genitals or your groin area.    Summary  Priapism is an unwanted erection of the penis that usually develops without sexual stimulation or desire.  Treatment for this condition depends on the cause and the type of priapism. Recurrent acute priapism is often managed at home. Acute prolonged priapism is usually treated at a hospital.  Avoid drugs or alcohol if they caused the priapism. This can help to prevent the condition from coming back.  Take over-the-counter and prescription medicines only as told by your health care provider.  Contact a health care provider if you have recurrent priapism and your erection does not go away in 4 hours.    ADDITIONAL NOTES AND INSTRUCTIONS    Please follow up with your Primary MD in 24-48 hr.  Seek immediate medical care for any new/worsening signs or symptoms.

## 2025-03-26 NOTE — ED ADULT NURSE NOTE - OBJECTIVE STATEMENT
Pt assessed, treated and d/c prior to this RN assessment Pt assessed, treated and d/c prior to this RN assessment. spoke to Dr. Engel who states pt no longer wants blood work or medication.

## 2025-03-26 NOTE — ED PROVIDER NOTE - PATIENT PORTAL LINK FT
You can access the FollowMyHealth Patient Portal offered by [facility] by registering at the following website: [email or web address]. By joining [email or web address]’s FollowMyHealth portal, you will also be able to view your health information using other applications (apps) compatible with our system.

## 2025-03-26 NOTE — ED PROVIDER NOTE - CLINICAL SUMMARY MEDICAL DECISION MAKING FREE TEXT BOX
48 yo male with history of erectile dysfunction presents due to persistent erection. Patient states he had made plans for "company"  to come over. He took a dose of Viagra around 6pm. However, later on he realized his company wound not be arriving until 12am. He states just before they arrived he injected a medication, prescribed by his urologist from [place], into his penis to maintain and erection.  He states he has had a erection since.  However, upon presentation to the examination room he states his Penis became flaccid.     Patient without continued erection. Recommended labs for full evaluation. Patient initially agreed. However, after waiting about a hour for labs he felt better and states he will just f/u with his urologist.

## 2025-03-26 NOTE — ED ADULT TRIAGE NOTE - CHIEF COMPLAINT QUOTE
Pt c/o priapism for 6 hrs.  States taking an injection for ED at midnight.  Currently c/o discomfort.  No previous hx of priapism with use of injection.

## 2025-03-29 DIAGNOSIS — Z88.0 ALLERGY STATUS TO PENICILLIN: ICD-10-CM

## 2025-03-29 DIAGNOSIS — N48.30 PRIAPISM, UNSPECIFIED: ICD-10-CM

## 2025-03-29 DIAGNOSIS — F17.210 NICOTINE DEPENDENCE, CIGARETTES, UNCOMPLICATED: ICD-10-CM

## 2025-04-16 ENCOUNTER — APPOINTMENT (OUTPATIENT)
Dept: ORTHOPEDIC SURGERY | Facility: CLINIC | Age: 50
End: 2025-04-16
Payer: COMMERCIAL

## 2025-04-16 DIAGNOSIS — S62.109D FRACTURE OF UNSPECIFIED CARPAL BONE, UNSPECIFIED WRIST, SUBSEQUENT ENCOUNTER FOR FRACTURE WITH ROUTINE HEALING: ICD-10-CM

## 2025-04-16 DIAGNOSIS — M65.931 UNSPECIFIED SYNOVITIS AND TENOSYNOVITIS, RIGHT FOREARM: ICD-10-CM

## 2025-04-16 DIAGNOSIS — M65.932 UNSPECIFIED SYNOVITIS AND TENOSYNOVITIS, LEFT FOREARM: ICD-10-CM

## 2025-04-16 DIAGNOSIS — M25.531 PAIN IN RIGHT WRIST: ICD-10-CM

## 2025-04-16 DIAGNOSIS — S52.509D UNSPECIFIED FRACTURE OF THE LOWER END OF UNSPECIFIED RADIUS, SUBSEQUENT ENCOUNTER FOR CLOSED FRACTURE WITH ROUTINE HEALING: ICD-10-CM

## 2025-04-16 PROCEDURE — 20550 NJX 1 TENDON SHEATH/LIGAMENT: CPT | Mod: RT

## 2025-04-16 PROCEDURE — 73110 X-RAY EXAM OF WRIST: CPT | Mod: RT

## 2025-04-16 PROCEDURE — 99214 OFFICE O/P EST MOD 30 MIN: CPT | Mod: 25

## 2025-04-16 PROCEDURE — 20605 DRAIN/INJ JOINT/BURSA W/O US: CPT | Mod: RT,59

## 2025-06-04 ENCOUNTER — APPOINTMENT (OUTPATIENT)
Dept: ORTHOPEDIC SURGERY | Facility: CLINIC | Age: 50
End: 2025-06-04
Payer: COMMERCIAL

## 2025-06-04 DIAGNOSIS — M65.931 UNSPECIFIED SYNOVITIS AND TENOSYNOVITIS, RIGHT FOREARM: ICD-10-CM

## 2025-06-04 DIAGNOSIS — M25.631 STIFFNESS OF RIGHT WRIST, NOT ELSEWHERE CLASSIFIED: ICD-10-CM

## 2025-06-04 PROCEDURE — 99214 OFFICE O/P EST MOD 30 MIN: CPT | Mod: 25

## 2025-06-04 PROCEDURE — 20605 DRAIN/INJ JOINT/BURSA W/O US: CPT | Mod: RT,59

## 2025-06-04 PROCEDURE — 20550 NJX 1 TENDON SHEATH/LIGAMENT: CPT | Mod: RT

## 2025-08-19 ENCOUNTER — APPOINTMENT (OUTPATIENT)
Dept: ORTHOPEDIC SURGERY | Facility: CLINIC | Age: 50
End: 2025-08-19
Payer: COMMERCIAL

## 2025-08-19 VITALS — WEIGHT: 180 LBS | BODY MASS INDEX: 26.66 KG/M2 | HEIGHT: 69 IN

## 2025-08-19 DIAGNOSIS — M25.631 STIFFNESS OF RIGHT WRIST, NOT ELSEWHERE CLASSIFIED: ICD-10-CM

## 2025-08-19 DIAGNOSIS — M65.931 UNSPECIFIED SYNOVITIS AND TENOSYNOVITIS, RIGHT FOREARM: ICD-10-CM

## 2025-08-19 DIAGNOSIS — S62.109D FRACTURE OF UNSPECIFIED CARPAL BONE, UNSPECIFIED WRIST, SUBSEQUENT ENCOUNTER FOR FRACTURE WITH ROUTINE HEALING: ICD-10-CM

## 2025-08-19 PROCEDURE — 99214 OFFICE O/P EST MOD 30 MIN: CPT | Mod: 25

## 2025-08-19 PROCEDURE — 20550 NJX 1 TENDON SHEATH/LIGAMENT: CPT | Mod: RT

## 2025-08-19 PROCEDURE — 73110 X-RAY EXAM OF WRIST: CPT | Mod: RT

## 2025-08-19 PROCEDURE — 20605 DRAIN/INJ JOINT/BURSA W/O US: CPT | Mod: RT,59
